# Patient Record
Sex: FEMALE | Race: WHITE | NOT HISPANIC OR LATINO | Employment: UNEMPLOYED | URBAN - METROPOLITAN AREA
[De-identification: names, ages, dates, MRNs, and addresses within clinical notes are randomized per-mention and may not be internally consistent; named-entity substitution may affect disease eponyms.]

---

## 2022-09-23 ENCOUNTER — ULTRASOUND (OUTPATIENT)
Dept: OBGYN CLINIC | Facility: MEDICAL CENTER | Age: 32
End: 2022-09-23
Payer: COMMERCIAL

## 2022-09-23 VITALS
BODY MASS INDEX: 38.83 KG/M2 | SYSTOLIC BLOOD PRESSURE: 120 MMHG | WEIGHT: 241.6 LBS | DIASTOLIC BLOOD PRESSURE: 78 MMHG | HEIGHT: 66 IN

## 2022-09-23 DIAGNOSIS — Z3A.08 8 WEEKS GESTATION OF PREGNANCY: ICD-10-CM

## 2022-09-23 DIAGNOSIS — O24.111 PRE-EXISTING TYPE 2 DIABETES MELLITUS DURING PREGNANCY IN FIRST TRIMESTER: ICD-10-CM

## 2022-09-23 DIAGNOSIS — Z36.89 ESTABLISH GESTATIONAL AGE, ULTRASOUND: ICD-10-CM

## 2022-09-23 DIAGNOSIS — O09.299 HISTORY OF PRE-ECLAMPSIA IN PRIOR PREGNANCY, CURRENTLY PREGNANT: ICD-10-CM

## 2022-09-23 DIAGNOSIS — N91.2 AMENORRHEA: Primary | ICD-10-CM

## 2022-09-23 DIAGNOSIS — O34.219 PREVIOUS CESAREAN DELIVERY, ANTEPARTUM: ICD-10-CM

## 2022-09-23 PROBLEM — O24.119 PRE-EXISTING TYPE 2 DIABETES MELLITUS DURING PREGNANCY: Status: ACTIVE | Noted: 2022-09-23

## 2022-09-23 PROCEDURE — 76817 TRANSVAGINAL US OBSTETRIC: CPT | Performed by: CLINICAL NURSE SPECIALIST

## 2022-09-23 RX ORDER — BLOOD SUGAR DIAGNOSTIC
STRIP MISCELLANEOUS
COMMUNITY
Start: 2022-08-26 | End: 2022-10-26 | Stop reason: SDUPTHER

## 2022-09-23 RX ORDER — LANCETS
EACH MISCELLANEOUS
COMMUNITY
Start: 2022-08-26

## 2022-09-23 RX ORDER — FLUOXETINE HYDROCHLORIDE 40 MG/1
80 CAPSULE ORAL DAILY
COMMUNITY
Start: 2022-09-20

## 2022-09-23 RX ORDER — ASPIRIN 81 MG
162 TABLET,CHEWABLE ORAL DAILY
COMMUNITY
Start: 2022-09-19

## 2022-09-23 RX ORDER — BLOOD-GLUCOSE METER
KIT MISCELLANEOUS
COMMUNITY
Start: 2022-08-26

## 2022-09-23 NOTE — PATIENT INSTRUCTIONS
Again, congratulations on your pregnancy! NEXT STEPS  Get Prenatal bloodwork  Call MFM to schedule next ultrasound (done 12-13 wks)   Contact information for [facility] (AKA Maternal Fetal Medicine)- Main Number [phone]   Return to our office in 1-2 weeks for an OB intake and initial prenatal Exam(or sooner if any problems/concerns arise- see packet for things to report)  Check out [employer] website to read the "Pregnancy Essentials Guide" -this has lots of great early pregnancy information     It can be found by going to [employer]  org-->select services-->select women's health-->select Obstetrics  [email or web address] [email or web address]    Below is a variety of information that is useful in early pregnancy   Genetic screening can be very confusing for most people   We do recommend genetic screening universally for all pregnant women  Our goal is to have the most healthy uncomplicated pregnancy possible and this is one way to identify possible complications early  Common disorders we can screen for include: Down Syndrome, Neural tube defects ( like spina bifida or gastroschisis) and trisomy 15, even possibly more  There are several options we will talk more about  Below is some information to get you started thinking about this  We will discuss this more at the next appt  GUIDE TO GENETIC TESTING    Aneuploidy Testing  Trisomy 21 (Down Syndrome), Trisomy 18, and Open Neural Tube Defects (Spina Bifida)  You may choose one of the following options: See below for CPT/Diagnostic codes  NIPT (Non-Invasive Prenatal Testing or Cell Free- Fetal DNA): This simple and accurate non-invasive prenatal screening blood test offers results for early risk assessment of Down syndrome (Trisomy 21), or Trisomy 25, trisomy 15 and other aneuploidy conditions  The test also offers an optional analysis for fetal sex    The test analyzes the relative amount of 21, 18, 13; X and Y chromosome material in circulating cell-free DNA from a maternal blood sample  This test can be performed at any time after 10 weeks gestation  If you elect this test, you will also have an AFP (alpha-fetoprotein) blood test to test for open neural tube defects  Recommended follow up to a positive result is genetic counseling and prenatal diagnosis  Sequential Screening with Nuchal Translucency: This is a two-step test to detect whether a fetus is at increased risk for trisomy 24, trisomy 25, trisomy 15 and open neural tube defects  The test has a narrow window for testing (the first step must be performed between 10 and 13 weeks gestation)  It includes two blood draws and an ultrasound  The ultrasound measures the amount of fluid behind the babys neck called the nuchal translucency (NT)  The blood tests measures three different maternal hormone levels, -- pregnancy associated plasma protein (YANIQUE-A), human chorionic gonadotrophin (hCG), and dimeric inhibin A (KARUNA)  These measurements in combination with some maternal information such as height and weight are used to calculate whether the baby is at increased risk for Down Syndrome or Trisomy 18  An alpha-fetoprotein test (AFP) is also included to test for open neural tube defects  Recommended follow up to a positive result is additional testing that is more definitive, and referral for genetic counseling and prenatal diagnosis  Quad Screen: This test is also known as the quadruple marker test   It is a test that measures levels of four substances in a pregnant womans blood--Alpha-fetoprotein (AFP), a protein made by the developing baby; Human chorionic gonadotropin (hCG), a hormone made by the placenta; Estriol, a hormone made by the placenta and the babys liver; and Inhibin A, another hormone made by the placenta    Typically, the quad screen is done between weeks 15 and 20 of pregnancy--the second trimester and the results indicate whether the baby is at higher risk for Down Syndrome (Trisomy 21), Trisomy 18, and open neural tube defects  This is a screening test   Recommended follow up to a positive result is additional testing that is more definitive, as well as referral for genetic counseling and prenatal diagnosis  Trisomy 21 Trisomy 18 Trisomy 13   NIPT  (FPR 0 1%) <99% <98% 99%   Sequential Screening (FPR 3 5%) 92% 90% N/A   Quad Screen   (FPR 5%) 83% 80% N/A   (FPR is False Positive Rate)       Additional Screening Tests Offered  Cystic Fibrosis: Cystic Fibrosis is the most common inherited disease of children and young adults  The carrier frequency is 1 in 24, to 1 in 97  Both parents need to be carriers for a child to be affected (25% chance)  One in 200, children born are affected  Cystic fibrosis is a disorder of mucus production and produces abnormally thick mucus leading to life threatening lung infections, digestion problems, poor growth, infertility, and more  Symptoms range from mild to severe, but individuals with severe disease may die in childhood  With treatments today, people with Cystic Fibrosis can live into their 30s  CF does not affect intelligence  Recommended follow up to a positive result is testing of the babys father  Spinal Muscular Atrophy (SMA): SMA is the most common inherited cause of early childhood death  The carrier frequency is 1 in 52 to 1 in 67 in the [place] and both parents need to be carriers for a child to be affected (25% chance)  1 in 11,000 children are affected  SMA is a progressive degeneration of lower motor neurons  Muscle weakness is the most common type with respiratory failure by the age of 3years old  Muscles responsible for crawling, walking, swallowing, and head and neck control are most severely affected  Recommended follow up to a positive result is testing of the babys father        Fragile X Syndrome (the most common inherited cause of developmental delays): Fragile X syndrome is an X-linked genetic disease, which means it is only carried by the mom  Unfortunately, 1 in 250 females are carriers and a child has a 50% chance of being affected if this is the case  1 in 4000 boys is affected with Fragile X and 1 in 8000 girls is affected  Approximately 1/3 of all children born with Fragile X also have autism and hyperactivity  Recommended follow up to a positive result is genetic counseling and prenatal diagnosis  Guide To Insurance Coverage For Genetic Screening  Due to the complexity of coverage guidelines, we are unable to quote benefits or guarantee insurance coverage for any of these tests  Insurance benefits are plan-specific and offer vastly different coverage based on your policy  The handout attached explains the benefits to each test, and also provides the billing (CPT) codes for each test   Even if the testing is covered, it could be applied to any unmet deductibles, and copays may apply, resulting in a bill  You are encouraged to contact your insurance company to obtain your benefits based on your age and risk factors, so that there are no surprises  Test Insurance Procedure (CPT) code   NIPT-cell free fetal DNA Most accurate non-invasive test- not always covered w/o risk factors 12054   Sequential Screen w/ NT US Current standard test-should be covered Part 1: (if lab is [facility]) 91256,30865   (If lab is [facility]) [address]67  Part 2: (if lab is [facility])47236,24603,84928, 31089  (If lab is [facility]) 53328   Quad screen Old standard-use if past 1st trimester 63100, [address] [facility], [address], [address]   CF- Cystic Fibrosis  89997   SMA- Spinal Musc   Atrophy  37880   Fragile X  [id number]       Diagnosis code used Varies based on history- But will be one of these:  Encounter for  screening for other genetic defect Z36 8  Advanced Maternal Age (over 28) O12 46  Family History of Genetic Disease Carrier Z84 81    Please contact your insurance company with the appropriate CPT code from the attached list, and diagnosis code applicable to your situation  We ask that you review this information and decide what testing you would like to have performed  Please note that the Sequential Screening with Nuchal Translucency has a smaller window of time to be performed during pregnancy (Prior to 14 wks)  [address], [address] [facility]    Warning Signs During Pregnancy  The list below includes warning signs your providers would like you to be aware of  If you experience any of these at any time during your pregnancy, please call us as soon as possible  Vaginal bleeding   Sharp abdominal pain that does not go away   Fever (more than 100  4? F and is not relieved with Tylenol)   Persistent vomiting lasting greater than 24 hours   Chest pain   Pain or burning when you urinate   Severe headache that doesnt resolve with Tylenol   Blurred vision or seeing spots in your vision   Sudden swelling of your face or hands   Redness, swelling or pain in a leg   A sudden weight gain in just a few days   Decrease in your babys movements (after 28 weeks or the 6th month of pregnancy)   A loss of watery fluid from your vagina - can be a gush, a trickle or  continuous wetness   After 20 weeks of pregnancy, rhythmic cramping (greater than 4 per hour)  or menstrual like low/pelvic pain    Call the OFFICE [phone] for any questions/emergencies  At night or on the weekend, please indicate it is an emergency and the DOCTOR on call will be paged      Discomforts of Early Pregnancy    Tips for coping with nausea and vomiting during pregnancy   Eat meals and snacks slowly   Eat every 1-2 hours to avoid a full stomach   Dont skip meals, avoid empty stomach   Eat a snack prior to getting out of bed   Avoid food and beverages with a strong aroma   Avoid dehydration - drink enough fluid to keep the urine pale yellow   Drink fluids before a meal to minimize the effect of a full stomach   Limit the amount of coffee and beverages that contain caffeine   Eliminate spicy, odorous, high fat (fried foods), acidic (tomato products) and sweet foods   Fluids that contain lemon (lemonade), mint (tea) or orange can usually be well tolerated   Snacks and meals that contain low-fat protein (lean meats, fish, poultry and eggs) along with eating easily digestible carbs (fruit, rice, toast, crackers and dry cereal) may be tolerated better   Foods with ginger may be well tolerated  May use ginger root powder, capsules or extract (up to 1000 mg per day)   Drink liquids in small amounts    If symptoms persist, please contact your provider  Tips for coping with constipation during pregnancy   Increase fiber and fluids   - Drink 8-10 cups of liquid, like water or low-sugar juice daily  - Keep urine pale with fluids (water, milk), fruit and vegetables   Eat a well-balanced diet that contains high fiber food (fruits, vegetables, whole grain breads and cereals, bran and dried beans)   Take a 30-minute walk daily   You may take a mild stool softener such as Colace®    If symptoms persist, please contact your provider  For any emergencies, PLEASE CALL THE OFFICE at [phone]  If the office is closed, the doctor on call will be paged by the answering service  Medications and Pregnancy- see Pregnancy Essentials guide online- page 9    Expected Weight Gain During Pregnancy  If you have a healthy BMI (18-25) prior to pregnancy:  The recommended weight gain is between 25-35 pounds  Approximate weight gain  in the first trimester is 1-4 5 pounds  An expected weight gain during the second and  third trimester is approximately one pound per week  If you have a BMI of less than 18 prior to pregnancy,  you are considered underweight:  The recommended weight gain is between 28-40 pounds  Approximate weight gain  in the first trimester is 1-4 5 pounds   An expected weight gain during the second and  third trimester is just over one pound per week  If your BMI is 25 to 29 9: you are considered overweight:  You should gain 1/2 to 2/3 pound during the second and third trimester, for a total  weight gain of 15 to 25 pounds  If you have a BMI of greater than 30 prior to pregnancy,  you are considered overweight:  The recommended weight gain is between 15-25 pounds  Approximate weight gain  in the first trimester is 1-4 5 pounds  An expected weight gain during the second  and third trimester is approximately 0 5 pound per week  Foods to avoid during pregnancy:   Unpasteurized milk, juice and cheese  - Soft cheeses like feta or brie (if made with UNPASTEURIZED milk)   Unheated deli meats like lunchmeat and hotdogs   Undercooked poultry, beef, pork, seafood including raw sushi    What fish is safe to eat during pregnancy? Eat 8 to 12 ounces of fish a week  Pick from this group frequently, especially if you follow  the American Heart Associations recommendation to eat fish at least 2 times a week  AVOID HIGH MERCURY FISH  A single meal of the following fish can put  you over the Environmental Protection  Agencys safe limit for the month  High mercury fish:  Shark  Swordfish  [employer]  Tile Fish    Caffeine and Pregnancy    The  and [address] [place] [address] of Obstetrics and Gynecologists (ACOG) urge pregnant  women to limit their caffeine consumption to no more than 200 milligrams (mg) per day  This is  comparable to having one 12-ounce cup of coffee a day  This level has been shown not to increase risk  of miscarriage, growth or  labor complications  Effects of higher levels are not known  Exercise During Pregnancy  A daily exercise program that consists of 30 minutes a day is recommended     Low impact exercises like walking and swimming are great exercises throughout  all of pregnancy   If youre an avid strength  avoid lifting very heavy weights - nothing more  than 30 pounds    Drink plenty of fluids while exercising to stay hydrated  Be careful to avoid overheating  ACTIVITIES TO AVOID   Exercises that can make you lose your balance  Activities that can put your baby at risk i e  horseback riding, scuba diving, skiing  or snowboarding  Any other sport that puts you at risk for getting hit in the  abdominal area  Do not use saunas, steam rooms or hot tubs (that have a higher temperature  than 100F)   After the first trimester, avoid exercises that require you to lay flat on your back  Avoid exceeding a heart rate greater than 140 beats per minute   As long as you are  able to hold a conversation while exercising your heart rate is likely acceptable

## 2022-09-23 NOTE — PROGRESS NOTES
Subjective  Patient ID: Rosibel Light is a 28 y o  female here for Pregnancy Ultrasound (LMP 22 = EGA 9+5/7d = 23)    She is C/O amenorrhea  Signs and symptoms of pregnancy:    Breast tenderness yes   Fatigue yes   Cramping or Pelvic Pain no   Spotting or Vaginal Bleeding no   Nausea or vomiting + nausea, no vomtting    Patient's last menstrual period was 2022 (exact date)  giving her an CANDELARIA of 23 and a gestational age of  6w7d  (based on LMP)    Menstrual cycle: irregular,   Pregnancy was unplanned/surpised  She has started taking a prenatal vitamin    OB History    Para Term  AB Living   3 2 2     2   SAB IAB Ectopic Multiple Live Births           2      # Outcome Date GA Lbr Osito/2nd Weight Sex Delivery Anes PTL Lv   3 Current            2 Term     M CS-Unspec   GAMALIEL   1 Term 2018    M CS-Unspec   GAMALIEL        The following portions of the patient's history were reviewed and updated as appropriate: allergies, current medications, past family history, past medical history, past social history, past surgical history, and problem list   Perinent hx that may affect pregnancy  · Type 2 DM,  · Hx of pre-e   · Previous C/S  · Bipolar dx    The following portions of the patient's history were reviewed and updated as appropriate: allergies, current medications, past family history, past medical history, past social history, past surgical history, and problem list     Review of Systems    See HPI for pertinent positives  /78 (BP Location: Left arm, Patient Position: Sitting, Cuff Size: Large)   Ht 5' 6" (1 676 m)   Wt 110 kg (241 lb 9 6 oz)   LMP 2022 (Exact Date)   Breastfeeding No   BMI 39 00 kg/m²   OBGyn Exam  No results found for this or any previous visit  FIRST TRIMESTER OBSTETRIC ULTRASOUND  [id number]    Patient's last menstrual period was 2022 (exact date)      INDICATION: establish GA      FINDINGS:  A single intrauterine gestation is identified  Gestational Sac: Present and normal appearing   Yolk Sac:  Present and normal in size and appearance  Mean Crown-Rump Length:  18 6 mm = 8weeks 3 days   Cardiac activity is detected at 170  Adnexa:  No adnexal mass or pathologic cyst   Cul de Sac:  No significant free fluid identified     IMPRESSION:  Single intrauterine pregnancy of 8 weeks 3days gestational age  Fetal cardiac activity detected  No adnexal masses seen  EDC by LMP: 23  EDC by this Ultrasound: 23  S< D    New EDC by this US                  Ultrasound Probe Disinfection    A transvaginal ultrasound was performed  Prior to use, disinfection was performed with High Level Disinfection Process ([place])  Probe serial number F: [id number] was used  Assessment/Plan:           Problem List Items Addressed This Visit     8 weeks gestation of pregnancy     She is a  with Patient's last menstrual period was 2022 (exact date)  US today is showing a viable IUP  S<D new EDC 23  F/U for ob intake, followed by initial prenatal exam in  2 wks            Relevant Orders    Ambulatory Referral to Maternal Fetal Medicine    History of pre-eclampsia in prior pregnancy, currently pregnant    Relevant Orders    Ambulatory Referral to Maternal Fetal Medicine    Pre-existing type 2 diabetes mellitus during pregnancy    Relevant Orders    Hemoglobin A1C    Ambulatory Referral to Maternal Fetal Medicine    Previous  delivery, antepartum    Relevant Orders    Ambulatory Referral to Maternal Fetal Medicine      Other Visit Diagnoses     Amenorrhea    -  Primary    Establish gestational age, ultrasound              Orders Placed This Encounter   Procedures    Hemoglobin A1C    Ambulatory Referral to Maternal Fetal Medicine

## 2022-09-23 NOTE — ASSESSMENT & PLAN NOTE
She is a  with Patient's last menstrual period was 2022 (exact date)  US today is showing a viable IUP  S<D new EDC 23  F/U for ob intake, followed by initial prenatal exam in  2 wks

## 2022-09-30 ENCOUNTER — INITIAL PRENATAL (OUTPATIENT)
Dept: OBGYN CLINIC | Facility: CLINIC | Age: 32
End: 2022-09-30
Payer: COMMERCIAL

## 2022-09-30 DIAGNOSIS — Z34.81 PRENATAL CARE, SUBSEQUENT PREGNANCY, FIRST TRIMESTER: Primary | ICD-10-CM

## 2022-09-30 PROCEDURE — T1001 NURSING ASSESSMENT/EVALUATN: HCPCS | Performed by: CLINICAL NURSE SPECIALIST

## 2022-09-30 NOTE — PROGRESS NOTES
OB INTAKE INTERVIEW  Patient is 28 y o    who presents for OB intake at 10-5 wks  She is accompanied by: phone interview  The father of her baby Keerthi Peck) is involved in the pregnancy and they are     Last Menstrual Period: 22  Ultrasound: Measured 9 weeks 5 days on 22  Estimated Date of Delivery: 23 via LMP     Signs/Symptoms of Pregnancy  Current pregnancy symptoms: nausea & vomiting- reviewed protocol for Vit B6 & Unisom  Constipation no  Headaches no  Cramping/spotting no  PICA cravings no    Diabetes-  Pt is a type 2 diabetic- takes metformin 1000 mg BID   If patient has 1 or more, please order early 1 hour GTT  History of GDM YES  BMI >35 YES  History of PCOS or current metformin use yes-metformin 1000 mg BID  History of LGA/macrosomic infant (4000g/9lbs) no    If patient has 2 or more, please order early 1 hour GTT  BMI>30 YES  AMA no  First degree relative with type 2 diabetes no  History of chronic HTN, hyperlipidemia, elevated A1C no  High risk race (, , ,  or ) no    Hypertension- if you answer yes, please order preeclampsia labs (cbc, comprehensive metabolic panel, urine protein creatinine ratio, uric acid)  History of of chronic HTN no  History of gestational HTN no  History of preeclampsia, eclampsia, or HELLP syndrome YES  History of diabetes YES  History of lupus, autoimmune disease, kidney disease no    Thyroid- if yes order TSH with reflex T4  History of thyroid disease no    Bleeding Disorder or Hx of DVT-patient or first degree relative with history of  Order the following if not done previously     (Factor V, antithrombin III, prothrombin gene mutation, protein C and S Ag, lupus anticoagulant, anticardiolipin, beta-2 glycoprotein)   no    OB/GYN-  History of abnormal pap smear no  History of HPV no  History of Herpes/HSV no  History of other STI (gonorrhea, chlamydia, trich) no  History of prior  no  History of prior  YES  History of  delivery prior to 36 weeks 6 days no  History of blood transfusion no  Ok for blood transfusion yes    Substance screening- if yes outside of tobacco for her or anyone in her home-order urine drug screen  History of tobacco use YES  Currently using tobacco no  Currently using alcohol no  Presently using drugs no  Past drug use  Yes- pt previously was taking medical marijuana for her mental health hx  IV drug use-If yes add Hep C antibody to labs no  Partner drug use no  Parent/Family drug use YES-pt's father  from accidental OD of methadone & xanax  MRSA Screening-   Does the pt have a hx of MRSA? no  If yes- please follow MRSA protocol and obtain a nasal swab for MRSA culture    Immunizations:  Influenza vaccine given this season no  Discussed Tdap vaccine yes  Discussed COVID Vaccine no    Genetic/MFM-  Do you or your partner have a history of any of the following in yourselves or first degree relatives? Cystic fibrosis no  Spinal muscular atrophy no  Hemoglobinopathy/Sickle Cell/Thalassemia no  Fragile X Intellectual Disability no    If yes, discuss carrier screening and recommend consultation with [facility]/genetic counseling  If no, discuss option for carrier screening and/or genetic testing with Nuchal Ultrasound  Patient interested yes  Appointment at [facility] made yes    Interview education  [facility] Pregnancy Essentials Book reviewed and discussed yes    Nurse/Family Partnership- patient may qualify no; referral placed no    Prenatal lab work scripts yes  Extra labs ordered:  Pre-eclampsia labs     The patient has a history now or in prior pregnancy notable for:   Hx of 2 prior C/S  Pt is a type 2 diabetic -metformin 100 mg BID  Hx of pre-eclampsia,  Hx of bipolar- serroquel & Prozac  - hx of medical marijuana use  Hx of left Salpingectomy     BMI  39 0  e       Details that I feel the provider should be aware of: Pt denies receiving both Flu & covid vaccines  Discussed importance of receiving both   PN phone interview completed  Pt happy with pregnancy  PN bldwk ordered, including pre-eclampsia labs  Advised pt go for fasting bldwk prior to PN1 visit scheduled for 10/10/22  Pt has appts scheduled at [facility] for  10/28/ & 11/18/22  Advised to call with any further questions/concerns         PN1 visit scheduled  The patient was oriented to our practice, reviewed delivering physicians and [facility] for Delivery  All questions were answered       Interviewed by: Kathrin Castle RN, [address] [place] [address]

## 2022-10-03 ENCOUNTER — APPOINTMENT (OUTPATIENT)
Dept: LAB | Facility: HOSPITAL | Age: 32
End: 2022-10-03
Payer: COMMERCIAL

## 2022-10-03 DIAGNOSIS — O24.111 PRE-EXISTING TYPE 2 DIABETES MELLITUS DURING PREGNANCY IN FIRST TRIMESTER: ICD-10-CM

## 2022-10-03 DIAGNOSIS — Z34.81 PRENATAL CARE, SUBSEQUENT PREGNANCY, FIRST TRIMESTER: Primary | ICD-10-CM

## 2022-10-03 LAB
ABO GROUP BLD: NORMAL
ALBUMIN SERPL BCP-MCNC: 4.1 G/DL (ref 3.5–5)
ALP SERPL-CCNC: 75 U/L (ref 46–116)
ALT SERPL W P-5'-P-CCNC: 51 U/L (ref 12–78)
ANION GAP SERPL CALCULATED.3IONS-SCNC: 11 MMOL/L (ref 4–13)
AST SERPL W P-5'-P-CCNC: 20 U/L (ref 5–45)
BACTERIA UR QL AUTO: ABNORMAL /HPF
BASOPHILS # BLD AUTO: 0.02 THOUSANDS/ΜL (ref 0–0.1)
BASOPHILS NFR BLD AUTO: 0 % (ref 0–1)
BILIRUB SERPL-MCNC: 0.38 MG/DL (ref 0.2–1)
BILIRUB UR QL STRIP: NEGATIVE
BLD GP AB SCN SERPL QL: NEGATIVE
BUN SERPL-MCNC: 8 MG/DL (ref 5–25)
CALCIUM SERPL-MCNC: 9.4 MG/DL (ref 8.3–10.1)
CHLORIDE SERPL-SCNC: 99 MMOL/L (ref 96–108)
CLARITY UR: CLEAR
CO2 SERPL-SCNC: 25 MMOL/L (ref 21–32)
COLOR UR: YELLOW
CREAT SERPL-MCNC: 0.47 MG/DL (ref 0.6–1.3)
CREAT UR-MCNC: 209 MG/DL
EOSINOPHIL # BLD AUTO: 0.06 THOUSAND/ΜL (ref 0–0.61)
EOSINOPHIL NFR BLD AUTO: 1 % (ref 0–6)
ERYTHROCYTE [DISTWIDTH] IN BLOOD BY AUTOMATED COUNT: 12.9 % (ref 11.6–15.1)
EST. AVERAGE GLUCOSE BLD GHB EST-MCNC: 143 MG/DL
GFR SERPL CREATININE-BSD FRML MDRD: 131 ML/MIN/1.73SQ M
GLUCOSE P FAST SERPL-MCNC: 107 MG/DL (ref 65–99)
GLUCOSE UR STRIP-MCNC: NEGATIVE MG/DL
HBA1C MFR BLD: 6.6 %
HCT VFR BLD AUTO: 42 % (ref 34.8–46.1)
HGB BLD-MCNC: 14.2 G/DL (ref 11.5–15.4)
HGB UR QL STRIP.AUTO: NEGATIVE
IMM GRANULOCYTES # BLD AUTO: 0.03 THOUSAND/UL (ref 0–0.2)
IMM GRANULOCYTES NFR BLD AUTO: 0 % (ref 0–2)
KETONES UR STRIP-MCNC: NEGATIVE MG/DL
LEUKOCYTE ESTERASE UR QL STRIP: NEGATIVE
LYMPHOCYTES # BLD AUTO: 2.44 THOUSANDS/ΜL (ref 0.6–4.47)
LYMPHOCYTES NFR BLD AUTO: 27 % (ref 14–44)
MCH RBC QN AUTO: 29.6 PG (ref 26.8–34.3)
MCHC RBC AUTO-ENTMCNC: 33.8 G/DL (ref 31.4–37.4)
MCV RBC AUTO: 88 FL (ref 82–98)
MONOCYTES # BLD AUTO: 0.45 THOUSAND/ΜL (ref 0.17–1.22)
MONOCYTES NFR BLD AUTO: 5 % (ref 4–12)
MUCOUS THREADS UR QL AUTO: ABNORMAL
NEUTROPHILS # BLD AUTO: 6.17 THOUSANDS/ΜL (ref 1.85–7.62)
NEUTS SEG NFR BLD AUTO: 67 % (ref 43–75)
NITRITE UR QL STRIP: NEGATIVE
NON-SQ EPI CELLS URNS QL MICRO: ABNORMAL /HPF
NRBC BLD AUTO-RTO: 0 /100 WBCS
PH UR STRIP.AUTO: 6.5 [PH]
PLATELET # BLD AUTO: 304 THOUSANDS/UL (ref 149–390)
PMV BLD AUTO: 9.6 FL (ref 8.9–12.7)
POTASSIUM SERPL-SCNC: 4.1 MMOL/L (ref 3.5–5.3)
PROT SERPL-MCNC: 8.4 G/DL (ref 6.4–8.4)
PROT UR STRIP-MCNC: NEGATIVE MG/DL
PROT UR-MCNC: 21 MG/DL
PROT/CREAT UR: 0.1 MG/G{CREAT} (ref 0–0.1)
RBC # BLD AUTO: 4.8 MILLION/UL (ref 3.81–5.12)
RBC #/AREA URNS AUTO: ABNORMAL /HPF
RH BLD: POSITIVE
SODIUM SERPL-SCNC: 135 MMOL/L (ref 135–147)
SP GR UR STRIP.AUTO: >=1.03 (ref 1–1.03)
SPECIMEN EXPIRATION DATE: NORMAL
URATE SERPL-MCNC: 2.9 MG/DL (ref 2–7.5)
UROBILINOGEN UR QL STRIP.AUTO: 0.2 E.U./DL
WBC # BLD AUTO: 9.17 THOUSAND/UL (ref 4.31–10.16)
WBC #/AREA URNS AUTO: ABNORMAL /HPF

## 2022-10-03 PROCEDURE — 80053 COMPREHEN METABOLIC PANEL: CPT

## 2022-10-03 PROCEDURE — 84550 ASSAY OF BLOOD/URIC ACID: CPT

## 2022-10-03 PROCEDURE — 36415 COLL VENOUS BLD VENIPUNCTURE: CPT

## 2022-10-03 PROCEDURE — 86901 BLOOD TYPING SEROLOGIC RH(D): CPT

## 2022-10-03 PROCEDURE — 87340 HEPATITIS B SURFACE AG IA: CPT

## 2022-10-03 PROCEDURE — 87389 HIV-1 AG W/HIV-1&-2 AB AG IA: CPT

## 2022-10-03 PROCEDURE — 81001 URINALYSIS AUTO W/SCOPE: CPT

## 2022-10-03 PROCEDURE — 82570 ASSAY OF URINE CREATININE: CPT

## 2022-10-03 PROCEDURE — 86850 RBC ANTIBODY SCREEN: CPT

## 2022-10-03 PROCEDURE — 84156 ASSAY OF PROTEIN URINE: CPT

## 2022-10-03 PROCEDURE — 87086 URINE CULTURE/COLONY COUNT: CPT

## 2022-10-03 PROCEDURE — 86900 BLOOD TYPING SEROLOGIC ABO: CPT

## 2022-10-03 PROCEDURE — 83036 HEMOGLOBIN GLYCOSYLATED A1C: CPT

## 2022-10-03 PROCEDURE — 86592 SYPHILIS TEST NON-TREP QUAL: CPT

## 2022-10-03 PROCEDURE — 85025 COMPLETE CBC W/AUTO DIFF WBC: CPT

## 2022-10-03 PROCEDURE — 86803 HEPATITIS C AB TEST: CPT

## 2022-10-04 LAB
BACTERIA UR CULT: NORMAL
HBV SURFACE AG SER QL: NORMAL
HCV AB SER QL: NORMAL
HIV 1+2 AB+HIV1 P24 AG SERPL QL IA: NORMAL
RPR SER QL: NORMAL

## 2022-10-10 ENCOUNTER — INITIAL PRENATAL (OUTPATIENT)
Dept: OBGYN CLINIC | Age: 32
End: 2022-10-10

## 2022-10-10 VITALS
HEIGHT: 66 IN | WEIGHT: 238.2 LBS | DIASTOLIC BLOOD PRESSURE: 70 MMHG | BODY MASS INDEX: 38.28 KG/M2 | SYSTOLIC BLOOD PRESSURE: 110 MMHG

## 2022-10-10 DIAGNOSIS — Z11.3 SCREENING FOR STD (SEXUALLY TRANSMITTED DISEASE): Primary | ICD-10-CM

## 2022-10-10 DIAGNOSIS — O21.9 NAUSEA AND VOMITING DURING PREGNANCY: ICD-10-CM

## 2022-10-10 DIAGNOSIS — Z3A.12 12 WEEKS GESTATION OF PREGNANCY: ICD-10-CM

## 2022-10-10 DIAGNOSIS — Z12.4 ENCOUNTER FOR SCREENING FOR CERVICAL CANCER: ICD-10-CM

## 2022-10-10 DIAGNOSIS — Z34.81 PRENATAL CARE, SUBSEQUENT PREGNANCY, FIRST TRIMESTER: ICD-10-CM

## 2022-10-10 PROBLEM — L92.0 GRANULOMA ANNULARE: Status: ACTIVE | Noted: 2022-09-19

## 2022-10-10 PROBLEM — Z87.59 HISTORY OF POLYHYDRAMNIOS: Status: ACTIVE | Noted: 2022-09-18

## 2022-10-10 PROBLEM — Z01.84 LACK OF IMMUNITY TO HEPATITIS B VIRUS DEMONSTRATED BY SEROLOGIC TEST: Status: ACTIVE | Noted: 2022-09-22

## 2022-10-10 PROBLEM — F41.9 ANXIETY: Status: ACTIVE | Noted: 2022-09-19

## 2022-10-10 PROBLEM — F32.A DEPRESSION AFFECTING PREGNANCY, ANTEPARTUM: Status: ACTIVE | Noted: 2022-09-18

## 2022-10-10 PROBLEM — Z13.32 ENCOUNTER FOR SCREENING FOR MATERNAL DEPRESSION: Status: ACTIVE | Noted: 2022-09-18

## 2022-10-10 PROBLEM — O99.340 DEPRESSION AFFECTING PREGNANCY, ANTEPARTUM: Status: ACTIVE | Noted: 2022-09-18

## 2022-10-10 PROBLEM — F12.90 MARIJUANA USE: Status: ACTIVE | Noted: 2022-09-18

## 2022-10-10 PROBLEM — O09.899 SHORT INTERVAL BETWEEN PREGNANCIES COMPLICATING PREGNANCY, ANTEPARTUM: Status: ACTIVE | Noted: 2022-09-18

## 2022-10-10 PROBLEM — F43.10 PTSD (POST-TRAUMATIC STRESS DISORDER): Status: ACTIVE | Noted: 2022-09-19

## 2022-10-10 PROBLEM — O99.210 OBESITY AFFECTING PREGNANCY, ANTEPARTUM: Status: ACTIVE | Noted: 2022-09-18

## 2022-10-10 LAB
SL AMB  POCT GLUCOSE, UA: POSITIVE
SL AMB POCT URINE PROTEIN: NEGATIVE

## 2022-10-10 PROCEDURE — G0145 SCR C/V CYTO,THINLAYER,RESCR: HCPCS

## 2022-10-10 PROCEDURE — G0476 HPV COMBO ASSAY CA SCREEN: HCPCS

## 2022-10-10 PROCEDURE — 87591 N.GONORRHOEAE DNA AMP PROB: CPT

## 2022-10-10 PROCEDURE — 87491 CHLMYD TRACH DNA AMP PROBE: CPT

## 2022-10-10 RX ORDER — ONDANSETRON 4 MG/1
4 TABLET, ORALLY DISINTEGRATING ORAL EVERY 6 HOURS PRN
Qty: 20 TABLET | Refills: 0 | Status: SHIPPED | OUTPATIENT
Start: 2022-10-10

## 2022-10-10 RX ORDER — QUETIAPINE FUMARATE 300 MG/1
300 TABLET, FILM COATED ORAL
COMMUNITY
Start: 2022-09-22 | End: 2022-10-26 | Stop reason: SDUPTHER

## 2022-10-10 NOTE — PROGRESS NOTES
PN1 12w1d    Patient denies any lof, vb or ctx  Patient urine is Protein neg/ Glucose ++++  Patient is diabetic  Prenatal labs is done  [facility] folder given  Flu vaccine declined  Pap, GC/CT collected today  Patient want to know when should she start taking Aspirin

## 2022-10-10 NOTE — PROGRESS NOTES
Problem   12 Weeks Gestation of Pregnancy    Blood Type: B positive  Antibody negative  Pap Not on file  GC/CT -  collected  PN1 Labs- WNL H&H- 14 2/42  0  baseline preE labs wnl   28 Week Labs-  COVID vaccine-   Flu vaccine -  Blue folder- Reviewed    Genetic screening- plans to complete NIPT  AFP-  Level 1- 10/28  Has VV with DP 10/21  Level 2- 11/18    Yellow folder-  TDAP -   Delivery consent-  Breast pump -   Pediatrician -    Perineal massage -  GBS swab -   IOL -     Lack of Immunity to Hepatitis B Virus Demonstrated By Serologic Test   Ptsd (Post-Traumatic Stress Disorder)   Granuloma Annulare    Last Assessment & Plan:   Formatting of this note might be different from the original   Multiple 8 mm flat lesions with dark border on c/sec scar, one L breast, one on R leg, some on abdomen  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  Has appt with dermatology in December  Anxiety   Obesity Affecting Pregnancy, Antepartum    Formatting of this note might be different from the original   Pregravid BMI: 39 24    Last Assessment & Plan:   Formatting of this note might be different from the original   Pregravid BMI: 39 24  Discussed diet/exercise/recommended weight gain for optimal health in pregnancy  Recommend protein in every meal and snack, drink 1 gallon of water daily, avoid sugar/white flour  Marijuana Use    Last Assessment & Plan:   Formatting of this note might be different from the original   Has medical marijuana card in [place], doesn't have PA card, recently moved from [place] and still needs to get PA ID  Struggles with anxiety/panic, found marijuana helpful  Recommend not to use marijuana in pregnancy       Short Interval Between Pregnancies Complicating Pregnancy, Antepartum    Formatting of this note might be different from the original   Previous birth 7/2021     History of Polyhydramnios   Encounter for Screening for Maternal Depression    Formatting of this note might be different from the original   Radha Lopez PP Depression Score:  NOB: 5  w:  PP:    Last Assessment & Plan:   Formatting of this note might be different from the original   Eden PP Depression Score: 5     Depression Affecting Pregnancy, Antepartum    Last Assessment & Plan:   Formatting of this note might be different from the original   Pt taking seroquel and prozac  Under care of a therapist, has an appointment tomorrow  States was diagnosed with bipolar but not sure what type  Pt will try to get records  Eden 5 today, feels mental health well controlled on medications  Crisis contact information provided  12 weeks gestation of pregnancy  PN1  Irena Rendon is a 28y o  year old  at 12w1d who presents for initial prenatal visit  LMP: Patient's last menstrual period was 2022 (exact date)  Gestational age 14w4d based on LMP Yes , consistent with early US Yes    3  Para 2002           Previous C Section: Yes x 2      PMHx significant for type 2 DM and history of preE in prior pregnancy  Her obstetrical, medical, surgical and family history was reviewed  Prepregnancy BMI: 38 76  total expected weight gain 5 kg (11 lb)-9 kg (19 lb)  Vitals:    10/10/22 1442   BP: 110/70   BP Location: Right arm   Patient Position: Sitting   Weight: 108 kg (238 lb 3 2 oz)   Height: 5' 6" (1 676 m)       Her physical exam was within normal limits    She reports nausea and vomiting  She has had no vaginal bleeding  Patients has no complaints  Denies pelvic pain or cramping  NT scan scheduled on 10/28  She is planning to complete genetic screening  Allergies: Patient has no known allergies    Medication use :   Current Outpatient Medications   Medication Sig Dispense Refill   • Blood Glucose Monitoring Suppl (OneTouch Verio Reflect) w/Device KIT DX: E11 9, THREE TIMES A DAY     • FLUoxetine (PROzac) 40 MG capsule Take 80 mg by mouth daily     • Lancets (onetouch ultrasoft) lancets TESTING 3X DAILY DX E11 9     • metFORMIN (GLUCOPHAGE) 1000 MG tablet Take 1,000 mg by mouth 2 (two) times a day with meals     • ondansetron (Zofran ODT) 4 mg disintegrating tablet Take 1 tablet (4 mg total) by mouth every 6 (six) hours as needed for nausea or vomiting 20 tablet 0   • OneTouch Verio test strip TESTING 3X DAILY DX E11 9     • Prenatal MV-Min-Fe Fum-FA-DHA (PRENATAL 1 PO) Take by mouth     • QUEtiapine Fumarate (SEROQUEL PO) Take 300 mg by mouth     • Aspirin Low Dose 81 MG chewable tablet Chew 162 mg daily (Patient not taking: No sig reported)     • QUEtiapine (SEROquel) 300 mg tablet Take 300 mg by mouth daily at bedtime (Patient not taking: Reported on 10/10/2022)       No current facility-administered medications for this visit  She is a non-smoker    Prenatal Labs   B positive  Antibody negative  CBC WNL  Hep B nonreactive  HIV nonreactive  RPR nonreactive  Rubella Immune  GCCT collected today   Pap Not on file  A Pap smear was obtained,    Risk factors for this pregnancy include: prior preE, obesity, type 2 DM  CS x2  Patient Education: Patient was counseled regarding diet, exercise, weight gain, foods to avoid, vaccines in pregnancy, aneuploidy screening, travel precautions to include seat belt use and VTE risk reduction  She has been provided our pregnancy packet which includes pregnancy warning signs,how and when to contact providers, visit intervals, Maternal fetal medicine information, medication recommendations that are safe during pregnancy, dietary suggestions, activity recommendations, breastfeeding information as well as websites for additional information, and delivery location

## 2022-10-10 NOTE — PATIENT INSTRUCTIONS
Pregnancy at 11 to 14 Weeks   AMBULATORY CARE:   Changes happening to your body: You are now at the end of your first trimester and entering your second trimester  Morning sickness usually goes away by this time  You may have other symptoms such as fatigue, frequent urination, and headaches  You may have gained 2 to 4 pounds by now  Seek care immediately if:   You have pain or cramping in your abdomen or low back  You have heavy vaginal bleeding or clotting  You pass material that looks like tissue or large clots  Collect the material and bring it with you  Call your doctor or obstetrician if:   You cannot keep food or drinks down, and you are losing weight  You have light vaginal bleeding  You have chills or a fever  You have vaginal itching, burning, or pain  You have yellow, green, white, or foul-smelling vaginal discharge  You have pain or burning when you urinate, less urine than usual, or pink or bloody urine  You have questions or concerns about your condition or care  How to care for yourself at this stage of your pregnancy:       Get plenty of rest   You may feel more tired than normal  You may need to take naps or go to bed earlier  Manage nausea and vomiting  Avoid fatty and spicy foods  Eat small meals throughout the day instead of large meals  Crys may help to decrease nausea  Ask your healthcare provider about other ways of decreasing nausea and vomiting  Eat a variety of healthy foods  Healthy foods include fruits, vegetables, whole-grain breads, low-fat dairy foods, beans, lean meats, and fish  Drink liquids as directed  Ask how much liquid to drink each day and which liquids are best for you  Limit caffeine to less than 200 milligrams each day  Limit your intake of fish to 2 servings each week  Choose fish low in mercury such as canned light tuna, shrimp, salmon, cod, or tilapia   Do not  eat fish high in mercury such as swordfish, tilefish, benigno mackerel, and shark  Take prenatal vitamins as directed  Your need for certain vitamins and minerals, such as folic acid, increases during pregnancy  Prenatal vitamins provide some of the extra vitamins and minerals you need  Prenatal vitamins may also help to decrease the risk of certain birth defects  Do not smoke  Smoking increases your risk of a miscarriage and other health problems during your pregnancy  Smoking can cause your baby to be born too early or weigh less at birth  Ask your healthcare provider for information if you need help quitting  Do not drink alcohol  Alcohol passes from your body to your baby through the placenta  It can affect your baby's brain development and cause fetal alcohol syndrome (FAS)  FAS is a group of conditions that causes mental, behavior, and growth problems  Talk to your healthcare provider before you take any medicines  Many medicines may harm your baby if you take them when you are pregnant  Do not take any medicines, vitamins, herbs, or supplements without first talking to your healthcare provider  Never use illegal or street drugs (such as marijuana or cocaine) while you are pregnant  Safety tips during pregnancy:   Avoid hot tubs and saunas  Do not use a hot tub or sauna while you are pregnant, especially during your first trimester  Hot tubs and saunas may raise your baby's temperature and increase the risk of birth defects  Avoid toxoplasmosis  This is an infection caused by eating raw meat or being around infected cat feces  It can cause birth defects, miscarriages, and other problems  Wash your hands after you touch raw meat  Make sure any meat is well-cooked before you eat it  Avoid raw eggs and unpasteurized milk  Use gloves or ask someone else to clean your cat's litter box while you are pregnant  Changes happening with your baby: Your baby has fully formed fingernails and toenails  Your baby's heartbeat can now be heard   Ask your healthcare provider if you can listen to your baby's heartbeat  By week 14, your baby is over 4 inches long from the top of the head to the rump (baby's bottom)  Your baby weighs over 3 ounces  Prenatal care:  Prenatal care is a series of visits with your healthcare provider throughout your pregnancy  During the first 28 weeks of your pregnancy, you will see your healthcare provider 1 time each month  Prenatal care can help prevent problems during pregnancy and childbirth  Your healthcare provider will check your blood pressure and weight  Your baby's heart rate will also be checked  You may also need the following at some visits:  A pelvic exam  allows your healthcare provider to see your cervix (the bottom part of your uterus)  Your healthcare provider will use a speculum to open your vagina  He or she will check the size and shape of your uterus  Blood tests  may be done to check for any of the following:    Gestational diabetes or anemia (low iron level)    Blood type or Rh factor, or certain birth defects    Immunity to certain diseases, such as chickenpox or rubella    An infection, such as a sexually transmitted infection, HIV, or hepatitis B    Hepatitis B  may need to be prevented or treated  Hepatitis B is inflammation of the liver caused by the hepatitis B virus (HBV)  HBV can spread from a mother to her baby during delivery  You will be checked for HBV as early as possible in the first trimester of each pregnancy  You need the test even if you received the hepatitis B vaccine or were tested before  You may need to have an HBV infection treated before you give birth  Urine tests  may also be done to check for sugar and protein  These can be signs of gestational diabetes or preeclampsia  Urine tests may also be done to check for signs of infection  A fetal ultrasound  shows pictures of your baby inside your uterus  The pictures are used to check your baby's development, movement, and position  Genetic disorder screening tests  may be offered to you  These tests check your baby's risk for genetic disorders such as Down syndrome  A screening test includes a blood test and ultrasound  Follow up with your doctor or obstetrician as directed:  Go to all prenatal visits  Write down your questions so you remember to ask them during your visits  © Copyright [employer] 2022 Information is for End User's use only and may not be sold, redistributed or otherwise used for commercial purposes  All illustrations and images included in CareNotes® are the copyrighted property of [employer]  or [place] [facility]   The above information is an  only  It is not intended as medical advice for individual conditions or treatments  Talk to your doctor, nurse or pharmacist before following any medical regimen to see if it is safe and effective for you  Nausea and Vomiting in Pregnancy   WHAT YOU NEED TO KNOW:   What do I need to know about nausea and vomiting in pregnancy? Nausea and vomiting can happen any time of day  These symptoms usually start before the 9th week of pregnancy, and end by the 14th week (second trimester)  Some women can have nausea and vomiting for a longer time  These symptoms can affect some women throughout the entire pregnancy  Nausea and vomiting do not harm your baby  These symptoms can make it hard for you to do your daily activities  What causes or increases my risk for nausea and vomiting in pregnancy? The cause of nausea and vomiting in pregnancy is not known  Pregnancy causes changes in your hormones that may lead to nausea and vomiting   The following may increase your risk of nausea and vomiting:  Being pregnant with more than one baby    Nausea and vomiting in a past pregnancy    Having a sister or mother who had nausea and vomiting during pregnancy    History of migraine headaches or motion sickness    Being pregnant with a female baby    How is nausea and vomiting in pregnancy treated? Treatment for nausea and vomiting in pregnancy is usually not needed  You can make changes in the foods you eat and in your activities to help manage your symptoms  You may need to try several things to learn what works for you  Talk to your healthcare provider if your symptoms do not decrease with the changes suggested below  You may need vitamin B6 and medicine if these changes do not help, or your symptoms become severe  What nutrition changes can I make to manage nausea and vomiting? Eat small meals throughout the day instead of 3 large meals  You may be more likely to have nausea and vomiting when your stomach is empty  Eat foods that are low in fat and high in protein  Examples are lean meat, beans, turkey, and chicken without the skin  Eat a small snack, such as crackers, dry cereal, or a small sandwich before you go to bed  Eat some crackers or dry toast before you get out of bed in the morning  Get out of bed slowly  Sudden movements could cause you to get dizzy and nauseated  Eat bland foods when you feel nauseated  Examples of bland foods include dry toast, dry cereal, plain pasta, white rice, and bread  Other bland foods include saltine crackers, bananas, gelatin, and pretzels  Avoid spicy, greasy, and fried foods  Avoid any other foods that make you feel nauseated  Drink liquids that contain ginger  Drink ginger ale made with real ginger or ginger tea made with fresh grated ginger  Ginger capsules or ginger candies may also help to decrease nausea and vomiting  Drink liquids between meals instead of with meals  Wait at least 30 minutes after you eat to drink liquids  Drink small amounts of liquids often throughout the day to prevent dehydration  Ask how much liquid you should drink each day  What other changes can I make to manage nausea and vomiting? Avoid smells that bother you    Strong odors may cause nausea and vomiting to start, or make it worse  Take a short walk, turn on a fan, or try to sleep with the window open to get fresh air  When you are cooking, open windows to get rid of smells that may cause nausea  Do not brush your teeth right after you eat  if it makes you nauseated  Rest when you need to  Start activity slowly and work up to your usual routine as you start to feel better  Talk to your healthcare provider about your prenatal vitamins  Prenatal vitamins can cause nausea for some women  Try taking your prenatal vitamin at night or with a snack  If this change does not help, your healthcare provider may recommend a different type of vitamin  Do not use any medicines, vitamins, or supplements to manage your symptoms without asking your healthcare provider  Many medicines can harm an unborn baby  Light to moderate exercise  may help to decrease your symptoms  It may also help you to sleep better at night  Ask your healthcare provider about the best exercise plan for you  When should I seek immediate care? You have signs of dehydration  Examples are dark yellow urine, dry mouth and lips, dry skin, fast heartbeat, and urinating less than usual     You have severe abdominal pain  You feel too weak or dizzy to stand up  You see blood in your vomit or bowel movements  When should I call my doctor? You vomit more than 4 times in 1 day  You have not been able to keep liquids down for more than 1 day  You lose more than 2 pounds  You have a fever  Your nausea and vomiting continue longer than 14 weeks  You have questions or concerns about your condition or care  CARE AGREEMENT:   You have the right to help plan your care  Learn about your health condition and how it may be treated  Discuss treatment options with your healthcare providers to decide what care you want to receive  You always have the right to refuse treatment  The above information is an  only   It is not intended as medical advice for individual conditions or treatments  Talk to your doctor, nurse or pharmacist before following any medical regimen to see if it is safe and effective for you  © Copyright [employer]  Information is for End User's use only and may not be sold, redistributed or otherwise used for commercial purposes  All illustrations and images included in CareNotes® are the copyrighted property of [employer]  or [address]:     [facility] pregnancy essential guide    http://ceasar weir/      On the right side of the screen is a 50 page guide providing valuable information about your entire pregnancy  On the left hand side of the site you will see several other links to great information and resources that [facility] offers     If you click on the tab that says "Pregnancy and Birth Packet" this opens another  guide to labor and delivery information as well as breast feeding information,  care, pediatricians, car seat safety and much more     The [facility] and [address] tab has a virtual tour of the new L&D unit, as well as valuable information about classes that are offered, breast feeding support, support groups and much more  I highly recommend the virtual Breast Feeding class, very informational even if you have breast fed in the past  Check for available dates ! Click around and enjoy all we have to offer!     Please note that all information in regards to locations and visiting hours have not been updated due to [address] [facility] delivery location is [address] [place] [address],[address] [id number] [place] [id number]

## 2022-10-10 NOTE — ASSESSMENT & PLAN NOTE
Clarissa Mckeon is a 28y o  year old  at 12w1d who presents for initial prenatal visit  LMP: Patient's last menstrual period was 2022 (exact date)  Gestational age 14w4d based on LMP Yes , consistent with early US Yes    3  Para 2002           Previous C Section: Yes x 2      PMHx significant for type 2 DM and history of preE in prior pregnancy  Her obstetrical, medical, surgical and family history was reviewed  Prepregnancy BMI: 38 76  total expected weight gain 5 kg (11 lb)-9 kg (19 lb)  Vitals:    10/10/22 1442   BP: 110/70   BP Location: Right arm   Patient Position: Sitting   Weight: 108 kg (238 lb 3 2 oz)   Height: 5' 6" (1 676 m)       Her physical exam was within normal limits    She reports nausea and vomiting  She has had no vaginal bleeding  Patients has no complaints  Denies pelvic pain or cramping  NT scan scheduled on 10/28  She is planning to complete genetic screening  Allergies: Patient has no known allergies    Medication use :   Current Outpatient Medications   Medication Sig Dispense Refill   • Blood Glucose Monitoring Suppl (OneTouch Verio Reflect) w/Device KIT DX: E11 9, THREE TIMES A DAY     • FLUoxetine (PROzac) 40 MG capsule Take 80 mg by mouth daily     • Lancets (onetouch ultrasoft) lancets TESTING 3X DAILY DX E11 9     • metFORMIN (GLUCOPHAGE) 1000 MG tablet Take 1,000 mg by mouth 2 (two) times a day with meals     • ondansetron (Zofran ODT) 4 mg disintegrating tablet Take 1 tablet (4 mg total) by mouth every 6 (six) hours as needed for nausea or vomiting 20 tablet 0   • OneTouch Verio test strip TESTING 3X DAILY DX E11 9     • Prenatal MV-Min-Fe Fum-FA-DHA (PRENATAL 1 PO) Take by mouth     • QUEtiapine Fumarate (SEROQUEL PO) Take 300 mg by mouth     • Aspirin Low Dose 81 MG chewable tablet Chew 162 mg daily (Patient not taking: No sig reported)     • QUEtiapine (SEROquel) 300 mg tablet Take 300 mg by mouth daily at bedtime (Patient not taking: Reported on 10/10/2022)       No current facility-administered medications for this visit  She is a non-smoker    Prenatal Labs   B positive  Antibody negative  CBC WNL  Hep B nonreactive  HIV nonreactive  RPR nonreactive  Rubella Immune  GCCT collected today   Pap Not on file  A Pap smear was obtained,    Risk factors for this pregnancy include: prior preE, obesity, type 2 DM  CS x2  Patient Education: Patient was counseled regarding diet, exercise, weight gain, foods to avoid, vaccines in pregnancy, aneuploidy screening, travel precautions to include seat belt use and VTE risk reduction  She has been provided our pregnancy packet which includes pregnancy warning signs,how and when to contact providers, visit intervals, Maternal fetal medicine information, medication recommendations that are safe during pregnancy, dietary suggestions, activity recommendations, breastfeeding information as well as websites for additional information, and delivery location

## 2022-10-12 ENCOUNTER — TELEPHONE (OUTPATIENT)
Dept: PERINATAL CARE | Facility: OTHER | Age: 32
End: 2022-10-12

## 2022-10-12 NOTE — TELEPHONE ENCOUNTER
Spoke with patient and confirmed her MFM appointment had to be rescheduled  Patient verbalized understanding of new time, date and location of appointment - 11/18/22  10:00  BRYAN  Patient denies further questions  ALSO:  Spoke with pt to inform them that at 11/18/22 appointment, doctor will not be available to see them with results of visit  After visit, patient can expect a call or message in [facility] with results  Pt understood and had no additional questions

## 2022-10-13 LAB
C TRACH DNA SPEC QL NAA+PROBE: NEGATIVE
HPV HR 12 DNA CVX QL NAA+PROBE: NEGATIVE
HPV16 DNA CVX QL NAA+PROBE: NEGATIVE
HPV18 DNA CVX QL NAA+PROBE: NEGATIVE
N GONORRHOEA DNA SPEC QL NAA+PROBE: NEGATIVE

## 2022-10-15 LAB
LAB AP GYN PRIMARY INTERPRETATION: NORMAL
Lab: NORMAL

## 2022-10-26 ENCOUNTER — TELEMEDICINE (OUTPATIENT)
Dept: PERINATAL CARE | Facility: CLINIC | Age: 32
End: 2022-10-26

## 2022-10-26 ENCOUNTER — TELEPHONE (OUTPATIENT)
Dept: PERINATAL CARE | Facility: CLINIC | Age: 32
End: 2022-10-26

## 2022-10-26 VITALS — BODY MASS INDEX: 38.25 KG/M2 | WEIGHT: 238 LBS | HEIGHT: 66 IN

## 2022-10-26 DIAGNOSIS — Z3A.14 14 WEEKS GESTATION OF PREGNANCY: ICD-10-CM

## 2022-10-26 DIAGNOSIS — O99.210 OBESITY AFFECTING PREGNANCY, ANTEPARTUM: ICD-10-CM

## 2022-10-26 DIAGNOSIS — O24.112 PRE-EXISTING TYPE 2 DIABETES MELLITUS DURING PREGNANCY IN SECOND TRIMESTER: Primary | ICD-10-CM

## 2022-10-26 RX ORDER — LANCETS 33 GAUGE
EACH MISCELLANEOUS
Qty: 100 EACH | Refills: 6 | Status: SHIPPED | OUTPATIENT
Start: 2022-10-26

## 2022-10-26 RX ORDER — BLOOD SUGAR DIAGNOSTIC
STRIP MISCELLANEOUS
Qty: 150 EACH | Refills: 6 | Status: SHIPPED | OUTPATIENT
Start: 2022-10-26

## 2022-10-26 RX ORDER — BLOOD-GLUCOSE METER
EACH MISCELLANEOUS
Qty: 1 KIT | Refills: 0 | Status: SHIPPED | OUTPATIENT
Start: 2022-10-26

## 2022-10-26 NOTE — ASSESSMENT & PLAN NOTE
-A1c 6 6% not at goal; aim for less than 6% with minimal hypoglycemia  -CMP within normal and baseline urine protein creatinine ratio within normal   -Follow up with dietitian   -Keep 3 day food log to review with dietitian   -Start self monitoring blood glucose fasting and 2 hours after start of each meal  Keep glucose log  Glucose goals: fasting 60-90 mg/dL, 140 mg/dL or less 1 hour post meals, and 120 mg/dL or less 2 hours post meal    -Report glucose readings weekly via [facility]   -Start GDM 2200 calorie meal plan with 3 meals and 3 snacks including recommended combination of carb, protein and fat per meal/snack   -Please eat meal or snack every 2-3 5 hours while awake   -No more than 8 to 10 hours of fasting overnight   -Stay active if no restriction from your OB, walk up to 30 minutes a day  -Always have glucose available to treat hypoglycemia  Use 15:15 rule  Refer to hypoglycemia patient education sheet  Test blood sugar when experiencing signs and symptoms of hypoglycemia and prior to driving    -Continue prenatal vitamin as recommended   -Continue follow-up with your OB and MFM as recommended   -Stay in close contact with diabetes education team   -Insulin requirements during pregnancy; basal/bolus concept and Metformin discussed  -Very important to maintain tight glucose control during pregnancy to decrease risk factors including fetal macrosomia; birth injury; risk of ; polyhydramnios; pre-term labor; pre-eclampsia;  hypoglycemia; jaundice and stillbirth  -Diabetes and pregnancy booklet; diet plan and hypoglycemia patient education                  Lab Results   Component Value Date    HGBA1C 6 6 (H) 10/03/2022

## 2022-10-26 NOTE — PATIENT INSTRUCTIONS
-A1c 6 6% not at goal; aim for less than 6% with minimal hypoglycemia  -CMP within normal and baseline urine protein creatinine ratio within normal   -Follow up with dietitian   -Keep 3 day food log to review with dietitian   -Start self monitoring blood glucose fasting and 2 hours after start of each meal  Keep glucose log  Glucose goals: fasting 60-90 mg/dL, 140 mg/dL or less 1 hour post meals, and 120 mg/dL or less 2 hours post meal    -Report glucose readings weekly via [employer]   -Start GDM 2200 calorie meal plan with 3 meals and 3 snacks including recommended combination of carb, protein and fat per meal/snack   -Please eat meal or snack every 2-3 5 hours while awake   -No more than 8 to 10 hours of fasting overnight   -Stay active if no restriction from your OB, walk up to 30 minutes a day  -Always have glucose available to treat hypoglycemia  Use 15:15 rule  Refer to hypoglycemia patient education sheet  Test blood sugar when experiencing signs and symptoms of hypoglycemia and prior to driving    -Continue prenatal vitamin as recommended   -Continue follow-up with your OB and MFM as recommended   -Stay in close contact with diabetes education team   -Insulin requirements during pregnancy; basal/bolus concept and Metformin discussed  -Very important to maintain tight glucose control during pregnancy to decrease risk factors including fetal macrosomia; birth injury; risk of ; polyhydramnios; pre-term labor; pre-eclampsia;  hypoglycemia; jaundice and stillbirth  -Diabetes and pregnancy booklet; diet plan and hypoglycemia patient education

## 2022-10-26 NOTE — ASSESSMENT & PLAN NOTE
-Pre-pregnancy weight 240 lbs  -Current weight 238 lbs  -Recommended weight gain 11 to 20 lbs  -Start GDM diet    -Follow up with dietitian

## 2022-10-26 NOTE — PROGRESS NOTES
Virtual Regular Visit    Verification of patient location:[place]    Patient is located in the following state in which I hold an active license [place]      Assessment/Plan:    Problem List Items Addressed This Visit        Endocrine    Pre-existing type 2 diabetes mellitus during pregnancy in second trimester - Primary     -A1c 6 6% not at goal; aim for less than 6% with minimal hypoglycemia  -CMP within normal and baseline urine protein creatinine ratio within normal   -Follow up with dietitian   -Keep 3 day food log to review with dietitian   -Start self monitoring blood glucose fasting and 2 hours after start of each meal  Keep glucose log  Glucose goals: fasting 60-90 mg/dL, 140 mg/dL or less 1 hour post meals, and 120 mg/dL or less 2 hours post meal    -Report glucose readings weekly via [employer]   -Start GDM 2200 calorie meal plan with 3 meals and 3 snacks including recommended combination of carb, protein and fat per meal/snack   -Please eat meal or snack every 2-3 5 hours while awake   -No more than 8 to 10 hours of fasting overnight   -Stay active if no restriction from your OB, walk up to 30 minutes a day  -Always have glucose available to treat hypoglycemia  Use 15:15 rule  Refer to hypoglycemia patient education sheet  Test blood sugar when experiencing signs and symptoms of hypoglycemia and prior to driving    -Continue prenatal vitamin as recommended   -Continue follow-up with your OB and MFM as recommended   -Stay in close contact with diabetes education team   -Insulin requirements during pregnancy; basal/bolus concept and Metformin discussed  -Very important to maintain tight glucose control during pregnancy to decrease risk factors including fetal macrosomia; birth injury; risk of ; polyhydramnios; pre-term labor; pre-eclampsia;  hypoglycemia; jaundice and stillbirth  -Diabetes and pregnancy booklet; diet plan and hypoglycemia patient education                  Lab Results   Component Value Date    HGBA1C 6 6 (H) 10/03/2022            Relevant Medications    Blood Glucose Monitoring Suppl (OneTouch Verio Flex System) w/Device KIT    OneTouch Delica Lancets 09S MISC    OneTouch Verio test strip    metFORMIN (GLUCOPHAGE) 1000 MG tablet    Other Relevant Orders    Mychart glucose flowsheet       Other    14 weeks gestation of pregnancy    Relevant Medications    Blood Glucose Monitoring Suppl (OneTouch Verio Flex System) w/Device KIT    OneTouch Delica Lancets 76L MISC    OneTouch Verio test strip    metFORMIN (GLUCOPHAGE) 1000 MG tablet    Other Relevant Orders    Mychart glucose flowsheet    Obesity affecting pregnancy, antepartum     -Pre-pregnancy weight 240 lbs  -Current weight 238 lbs  -Recommended weight gain 11 to 20 lbs  -Start GDM diet    -Follow up with dietitian  Relevant Medications    Blood Glucose Monitoring Suppl (OneTouch Verio Flex System) w/Device KIT    OneTouch Delica Lancets 29J MISC    OneTouch Verio test strip    metFORMIN (GLUCOPHAGE) 1000 MG tablet    Other Relevant Orders    Mychart glucose flowsheet    BMI 38 0-38 9,adult    Relevant Medications    Blood Glucose Monitoring Suppl (OneTouch Verio Flex System) w/Device KIT    OneTouch Delica Lancets 54H MISC    OneTouch Verio test strip    metFORMIN (GLUCOPHAGE) 1000 MG tablet    Other Relevant Orders    Mychart glucose flowsheet               Reason for visit is   Chief Complaint   Patient presents with   • Virtual Regular Visit   • Diabetes Type 2   • Patient Education        Encounter provider Patricia Singer, [facility]    Provider located at [address] [place] [address] Drive [address] [place] [id number] [phone]      Recent Visits  No visits were found meeting these conditions    Showing recent visits within past 7 days and meeting all other requirements  Today's Visits  Date Type Provider Dept   10/26/22 Telephone Patricia Singer, [address] [facility],[address]   10/26/22 Telemedicine Karma Nieves [place], [facility] today's visits and meeting all other requirements  Future Appointments  No visits were found meeting these conditions  Showing future appointments within next 150 days and meeting all other requirements       The patient was identified by name and date of birth  Erika Pepe was informed that this is a telemedicine visit and that the visit is being conducted through the [employer]  She agrees to proceed     My office door was closed  No one else was in the room  She acknowledged consent and understanding of privacy and security of the video platform  The patient has agreed to participate and understands they can discontinue the visit at any time  Patient is aware this is a billable service  Subjective  Erika Pepe is a 28 y o  female  14 3/7 weeks gestation T2DM diagnosed  with A1c 10%, recent A1c 6 6%  History of GDM with 3 yo and 3 yo  On Metformin 1000 mg with meals twice a day  Is not currently testing glucose  Eats 3 meals and 2 snacks a day  Having issues with nausea/vomitting, mostly in AM, on Zofran  History of pre-eclampsia with first pregnancy; started baby aspirin  Needs eye exam  Familiar with vial and syringe for insulin  Quick insulin pen reference sheet [facility]     Past Medical History:   Diagnosis Date   • Asthma     no meds/inhaler  in remission   • Depression     bipolar  300 mg serroquel  80 mg prozac   • Gestational diabetes     both pregnancies   • Preeclampsia     right after delivery with first 2018   • Type 2 diabetes mellitus ([address] )        • Varicella     had vaccines       Past Surgical History:   Procedure Laterality Date   •  SECTION      2018   • LAPAROSCOPY      left salpingectomy        Current Outpatient Medications   Medication Sig Dispense Refill   • Aspirin Low Dose 81 MG chewable tablet Chew 162 mg daily     • Blood Glucose Monitoring Suppl (OneTouch Verio Flex System) w/Device KIT Dispense 1 kit per insurance formulary  1 kit 0   • Blood Glucose Monitoring Suppl (OneTouch Verio Reflect) w/Device KIT DX: E11 9, THREE TIMES A DAY     • FLUoxetine (PROzac) 40 MG capsule Take 80 mg by mouth daily     • Lancets (onetouch ultrasoft) lancets TESTING 3X DAILY DX E11 9     • metFORMIN (GLUCOPHAGE) 1000 MG tablet Take 1 tablet (1,000 mg total) by mouth 2 (two) times a day with meals 60 tablet 6   • ondansetron (Zofran ODT) 4 mg disintegrating tablet Take 1 tablet (4 mg total) by mouth every 6 (six) hours as needed for nausea or vomiting 20 tablet 0   • OneTouch Delica Lancets 97J MISC Use 6 a day or as directed  T2DM and pregnancy  100 each 6   • OneTouch Verio test strip Test 6 times a day and as instructed  T2DM and pregnancy  150 each 6   • Prenatal MV-Min-Fe Fum-FA-DHA (PRENATAL 1 PO) Take by mouth     • QUEtiapine Fumarate (SEROQUEL PO) Take 300 mg by mouth       No current facility-administered medications for this visit  No Known Allergies    Review of Systems   Constitutional: Positive for fatigue  Negative for fever  HENT: Negative for congestion, sore throat and trouble swallowing  Eyes: Negative for visual disturbance  Respiratory: Negative for cough and shortness of breath  Cardiovascular: Negative for chest pain, palpitations and leg swelling  Gastrointestinal: Positive for nausea and vomiting  Negative for constipation and diarrhea  Endocrine: Negative for polydipsia, polyphagia and polyuria  Genitourinary: Negative for difficulty urinating and vaginal bleeding  Neurological: Negative for numbness and headaches  Psychiatric/Behavioral: Negative for sleep disturbance  Video Exam    Vitals:    10/26/22 1325   Weight: 108 kg (238 lb)   Height: 5' 6" (1 676 m)       Physical Exam  HENT:      Head: Normocephalic  Nose: Nose normal    Eyes:      Pupils: Pupils are equal, round, and reactive to light     Pulmonary:      Effort: Pulmonary effort is normal    Musculoskeletal:      Cervical back: Normal range of motion  Neurological:      Mental Status: She is alert and oriented to person, place, and time  Psychiatric:         Mood and Affect: Mood normal          Behavior: Behavior normal          Thought Content:  Thought content normal          Judgment: Judgment normal           I spent 60 minutes with patient today in which greater than 50% of the time was spent in counseling/coordination of care regarding reviewing chart; plan of care; patient education; etc

## 2022-10-27 ENCOUNTER — TELEPHONE (OUTPATIENT)
Dept: PERINATAL CARE | Facility: CLINIC | Age: 32
End: 2022-10-27

## 2022-10-27 NOTE — TELEPHONE ENCOUNTER
Called and lvm for pt letting them know their appt for 10/28 with MFM needs to be cancelled as she is over 13 w 6 d for the nuchal ultrasound  Pt already has same appointment scheduled for 11/18 but they will be able to see the baby better by that time  Told pt to call us back with any questions or concerns

## 2022-10-28 ENCOUNTER — CLINICAL SUPPORT (OUTPATIENT)
Dept: PERINATAL CARE | Facility: OTHER | Age: 32
End: 2022-10-28

## 2022-10-28 ENCOUNTER — APPOINTMENT (OUTPATIENT)
Dept: LAB | Facility: HOSPITAL | Age: 32
End: 2022-10-28
Payer: COMMERCIAL

## 2022-10-28 DIAGNOSIS — Z33.1 PREGNANT STATE, INCIDENTAL: Primary | ICD-10-CM

## 2022-10-28 DIAGNOSIS — Z33.1 PREGNANT STATE, INCIDENTAL: ICD-10-CM

## 2022-10-28 DIAGNOSIS — Z36.9 UNSPECIFIED ANTENATAL SCREENING: ICD-10-CM

## 2022-10-28 PROCEDURE — 36415 COLL VENOUS BLD VENIPUNCTURE: CPT

## 2022-11-07 ENCOUNTER — ROUTINE PRENATAL (OUTPATIENT)
Dept: OBGYN CLINIC | Age: 32
End: 2022-11-07

## 2022-11-07 VITALS
SYSTOLIC BLOOD PRESSURE: 108 MMHG | BODY MASS INDEX: 37.61 KG/M2 | WEIGHT: 234 LBS | HEIGHT: 66 IN | DIASTOLIC BLOOD PRESSURE: 76 MMHG

## 2022-11-07 DIAGNOSIS — O99.210 OBESITY AFFECTING PREGNANCY, ANTEPARTUM: ICD-10-CM

## 2022-11-07 DIAGNOSIS — Z33.1 PREGNANT STATE, INCIDENTAL: ICD-10-CM

## 2022-11-07 DIAGNOSIS — O34.219 PREVIOUS CESAREAN DELIVERY, ANTEPARTUM: ICD-10-CM

## 2022-11-07 DIAGNOSIS — O24.112 PRE-EXISTING TYPE 2 DIABETES MELLITUS DURING PREGNANCY IN SECOND TRIMESTER: ICD-10-CM

## 2022-11-07 DIAGNOSIS — Z36.9 ANTENATAL SCREENING ENCOUNTER: ICD-10-CM

## 2022-11-07 DIAGNOSIS — Z3A.16 16 WEEKS GESTATION OF PREGNANCY: Primary | ICD-10-CM

## 2022-11-07 DIAGNOSIS — O09.299 HISTORY OF PRE-ECLAMPSIA IN PRIOR PREGNANCY, CURRENTLY PREGNANT: ICD-10-CM

## 2022-11-07 LAB
SL AMB  POCT GLUCOSE, UA: NEGATIVE
SL AMB POCT URINE PROTEIN: NEGATIVE

## 2022-11-07 NOTE — PROGRESS NOTES
Problem   History of Pre-Eclampsia in Prior Pregnancy, Currently Pregnant    Developed PP     16 Weeks Gestation of Pregnancy    Blood Type: B positive  Antibody negative  Pap Not on file  GC/CT -  collected  PN1 Labs- WNL H&H- 14   0  baseline preE labs wnl   28 Week Labs-  COVID vaccine-   Flu vaccine - declined   Blue folder- Reviewed    Genetic screening- plans to complete NIPT  AFP- ordered   Level 1- 10/28  Has VV with DP 10/21  Level 2-     Yellow folder-  TDAP -   Delivery consent-  Breast pump -   Pediatrician -    Perineal massage -  GBS swab -   IOL -       16 weeks gestation of pregnancy  Anam Buchanan is a 28 y o  [id number] 16w1d here for routine prenatal care  Prepregnancy BMI 38 76 with a goal weight gain 5 kg (11 lb)-9 kg (19 lb)  TWG -2 722 kg (-6 lb)  Feels well  Denies LOF/CTX/VB  +FM  No concerns   labor precautions reviewed  Encouraged adequate hydration and nutrition  Pregnancy Essential guide and [facility] and Me center web site recommended      History of pre-eclampsia in prior pregnancy, currently pregnant  Started on  mg daily til 36 weeks

## 2022-11-07 NOTE — PATIENT INSTRUCTIONS
Pregnancy at 15 to 18 Weeks   AMBULATORY CARE:   What changes are happening to your body:  Now that you are in your second trimester, you have more energy  You may also feel hungrier than usual  You may start to experience other symptoms, such as heartburn or dizziness  You may be gaining about ½ to 1 pound a week, and your pregnancy is beginning to show  You may need to start wearing maternity clothes  Seek care immediately if:   You have pain or cramping in your abdomen or low back  You have heavy vaginal bleeding or clotting  You pass material that looks like tissue or large clots  Collect the material and bring it with you  Call your doctor or obstetrician if:   You cannot keep food or drinks down, and you are losing weight  You have light bleeding  You have chills or a fever  You have vaginal itching, burning, or pain  You have yellow, green, white, or foul-smelling vaginal discharge  You have pain or burning when you urinate, less urine than usual, or pink or bloody urine  You have questions or concerns about your condition or care  How to care for yourself at this stage of your pregnancy:       Manage heartburn  by eating 4 or 5 small meals each day instead of large meals  Avoid spicy foods  Avoid eating right before bedtime  Manage nausea and vomiting  Avoid fatty and spicy foods  Eat small meals throughout the day instead of large meals  Crys may help to decrease nausea  Ask your healthcare provider about other ways of decreasing nausea and vomiting  Eat a variety of healthy foods  Healthy foods include fruits, vegetables, whole-grain breads, low-fat dairy foods, beans, lean meats, and fish  Drink liquids as directed  Ask how much liquid to drink each day and which liquids are best for you  Limit caffeine to less than 200 milligrams each day  Limit your intake of fish to 2 servings each week   Choose fish low in mercury such as canned light tuna, shrimp, salmon, cod, or tilapia  Do not  eat fish high in mercury such as swordfish, tilefish, benigno mackerel, and shark  Take prenatal vitamins as directed  Your need for certain vitamins and minerals, such as folic acid, increases during pregnancy  Prenatal vitamins provide some of the extra vitamins and minerals you need  Prenatal vitamins may also help to decrease the risk of certain birth defects  Do not smoke  Smoking increases your risk of a miscarriage and other health problems during your pregnancy  Smoking can cause your baby to be born too early or weigh less at birth  Ask your healthcare provider for information if you need help quitting  Do not drink alcohol  Alcohol passes from your body to your baby through the placenta  It can affect your baby's brain development and cause fetal alcohol syndrome (FAS)  FAS is a group of conditions that causes mental, behavior, and growth problems  Talk to your healthcare provider before you take any medicines  Many medicines may harm your baby if you take them when you are pregnant  Do not take any medicines, vitamins, herbs, or supplements without first talking to your healthcare provider  Never use illegal or street drugs (such as marijuana or cocaine) while you are pregnant  Safety tips during pregnancy:   Avoid hot tubs and saunas  Do not use a hot tub or sauna while you are pregnant, especially during your first trimester  Hot tubs and saunas may raise your baby's temperature and increase the risk of birth defects  Avoid toxoplasmosis  This is an infection caused by eating raw meat or being around infected cat feces  It can cause birth defects, miscarriages, and other problems  Wash your hands after you touch raw meat  Make sure any meat is well-cooked before you eat it  Avoid raw eggs and unpasteurized milk  Use gloves or ask someone else to clean your cat's litter box while you are pregnant      Changes that are happening with your baby:  By 90 weeks, your baby may be about 6 inches long from the top of the head to the rump (baby's bottom)  Your baby may weigh about 11 ounces  You may be able to feel your baby's movement at about 18 weeks or later  The first movements may not be that noticeable  They may feel like a fluttering sensation  Your baby also makes sucking movements and can hear certain sounds  What you need to know about prenatal care:  During the first 28 weeks of your pregnancy, you will see your healthcare provider once a month  Your healthcare provider will check your blood pressure and weight  You may also need any of the following:  A urine test  may also be done to check for sugar and protein  These can be signs of gestational diabetes or infection  A blood test  may be done to check for anemia (low iron level)  Fundal height check  is a measurement of your uterus to check your baby's growth  This number is usually the same as the number of weeks that you have been pregnant  An ultrasound  may be done to check your baby's development  Your healthcare provider may be able to tell you what your baby's gender is during the ultrasound  Your baby's heart rate  will be checked  © Copyright [employer] 2022 Information is for End User's use only and may not be sold, redistributed or otherwise used for commercial purposes  All illustrations and images included in CareNotes® are the copyrighted property of [employer] M [employer]  or Shaina Morton   The above information is an  only  It is not intended as medical advice for individual conditions or treatments  Talk to your doctor, nurse or pharmacist before following any medical regimen to see if it is safe and effective for you  Alpha-1-fetoprotein measurement, serum   GENERAL INFORMATION:  What is this test?  This test measures alpha-fetoprotein (AFP) in blood  This test may assess an unborn baby for certain problems   Since AFP is a tumor marker, this test may also assess for certain types of cancer  What are other names for this test?  Serum alpha-fetoprotein  What are related tests? Chorionic gonadotropin, beta-subunit measurement  Diagnostic ultrasound of gravid uterus  Serum lactate dehydrogenase measurement  Ultrasonography of liver  Why do I need this test?  Laboratory tests may be done for many reasons  Tests are performed for routine health screenings or if a disease or toxicity is suspected  Lab tests may be used to determine if a medical condition is improving or worsening  Lab tests may also be used to measure the success or failure of a medication or treatment plan  Lab tests may be ordered for professional or legal reasons  You may need this test if you have:   Complication occurring during pregnancy  Liver carcinoma  Neural tube defect  Testicular cancer  When and how often should I have this test?  When and how often laboratory tests are done may depend on many factors  The timing of laboratory tests may rely on the results or completion of other tests, procedures, or treatments  Lab tests may be performed immediately in an emergency, or tests may be delayed as a condition is treated or monitored  A test may be suggested or become necessary when certain signs or symptoms appear  Due to changes in the way your body naturally functions through the course of a day, lab tests may need to be performed at a certain time of day  If you have prepared for a test by changing your food or fluid intake, lab tests may be timed in accordance with those changes  Timing of tests may be based on increased and decreased levels of medications, drugs or other substances in the body  The age or gender of the person being tested may affect when and how often a lab test is required  Chronic or progressive conditions may need ongoing monitoring through the use of lab tests  Conditions that worsen and improve may also need frequent monitoring   Certain tests may be repeated to obtain a series of results, or tests may need to be repeated to confirm or disprove results  Timing and frequency of lab tests may vary if they are performed for professional or legal reasons  How should I get ready for the test?  Venous blood:   Before having blood collected, tell the person drawing your blood if you are allergic to latex  Tell the healthcare worker if you have a medical condition or are using a medication or supplement that causes excessive bleeding  Also tell the healthcare worker if you have felt nauseated, lightheaded, or have fainted while having blood drawn in the past   If the test is for prenatal screening, tell the healthcare worker what pregnancy trimester you are in at the time of the test   Umbilical cord blood:   Ask the healthcare worker for information about how to prepare for this test    How is the test done? A sample of venous or umbilical cord blood may be collected for this test   Venous blood:   When a blood sample from a vein is needed, a vein in your arm is usually selected  A tourniquet (large rubber strap) may be secured above the vein  The skin over the vein will be cleaned, and a needle will be inserted  You will be asked to hold very still while your blood is collected  Blood will be collected into one or more tubes, and the tourniquet will be removed  When enough blood has been collected, the healthcare worker will take the needle out  Umbilical cord blood:   A method called percutaneous umbilical cord blood sampling may be used to collect an umbilical cord blood sample  For this method, a needle is inserted through the pregnant mother's abdomen and into the umbilical cord  Ultrasound is used to help guide the needle  Samples of the unborn baby’s umbilical cord blood are drawn out for testing  When enough blood is collected, the needle is removed  How will the test feel?   The amount of discomfort you feel will depend on many factors, including your sensitivity to pain  Communicate how you are feeling with the person doing the test  Inform the person doing the test if you feel that you cannot continue with the test   Venous blood:   During a blood draw, you may feel mild discomfort at the location where the blood sample is being collected  Umbilical cord blood: There are several different ways that a cord blood sample may be collected  Depending on the procedure used to obtain the sample, the test may be uncomfortable  Ask the healthcare worker to explain how the test may feel  What should I do after the test?  Venous blood:   After a blood sample is collected from your vein, a bandage, cotton ball, or gauze may be placed on the area where the needle was inserted  You may be asked to apply pressure to the area  Avoid strenuous exercise immediately after your blood draw  Contact your healthcare worker if you feel pain or see redness, swelling, or discharge from the puncture site  Umbilical cord blood:   Depending on the procedure used to obtain a sample of cord blood, there may be special instructions for you to follow  Ask the healthcare worker for any special instructions following this procedure  What are the risks? Blood: During a blood draw, a hematoma (blood-filled bump under the skin) or slight bleeding from the puncture site may occur  After a blood draw, a bruise or infection may occur at the puncture site  The person doing this test may need to perform it more than once  Talk to your healthcare worker if you have any concerns about the risks of this test   Umbilical cord blood: Before your baby is born, umbilical cord blood may be collected by a procedure called percutaneous umbilical cord blood sampling  This procedure is done by inserting a needle through the pregnant mother’s abdomen and into the umbilical cord   Risks of this procedure include bleeding from the puncture site, fetal bradycardia (the baby’s heart rate dropping abnormally low), infection, and thrombosis (blood clot) of the umbilical vein  Another risk of this procedure is an umbilical cord hematoma (a blood-filled pocket that puts pressure on the umbilical cord and can restrict flow of blood to the baby)  If you have a medical condition, or are using a medication or supplement that causes excessive bleeding, you are at a higher risk of bleeding from the puncture site  There is a risk that your baby will not survive the procedure  The person doing this procedure may need to perform it more than once  Talk to your healthcare worker if you have any concerns about the risks of having percutaneous umbilical cord blood sampling  What are normal results for this test?  Laboratory test results may vary depending on your age, gender, health history, the method used for the test, and many other factors  If your results are different from the results suggested below, this may not mean that you have a disease  Contact your healthcare worker if you have any questions  The following are considered to be normal results for this test:  Adults: <15 ng/mL (15 mcg/L)  Fetal blood (first trimester): Peak 200-400 mg/dL (2-4 g/L)  Pregnancy (2nd trimester):  14 weeks' gestation: Median 25 6 ng/mL (25 6 mcg/L)  15 weeks' gestation: Median 29 9 ng/mL (29 9 mcg/L)  16 weeks' gestation: Median 34 8 ng/mL (34 8 mcg/L)  17 weeks' gestation: Median 40 6 ng/mL (40 6 mcg/L)  18 weeks' gestation: Median 47 3 ng/mL (47 3 mcg/L)  19 weeks' gestation: Median 55 1 ng/mL (55 1 mcg/L)  20 weeks' gestation: Median 64 3 ng/mL (64 3 mcg/L)  21 weeks' gestation: Median 74 9 ng/mL (74 9 mcg/L)  The multiple of the median (MoM) value is adjusted for maternal weight, race, diabetes mellitus, and twin pregnancy   Normal values are approximately 15% higher for blacks or  Americans than for whites   Normal values are approximately 200% higher in women with twin pregnancy   What might affect my test results?   Results increased in (during pregnancy) : Underestimation of gestational age, based on recall of the last menstrual period, is a common cause of false results  Whenever the adjusted maternal serum multiple of the mean (MoM) alpha-fetoprotein is 2 or higher, fetal ultrasonography (biparietal diameter) is indicated to confirm gestational age   Comorbid hepatic disorder (eg, acute injury, fibrosis, chronic hepatitis)  Maternal blood sampled after insertion of amniocentesis needle  Results decreased in (during pregnancy): Type 2 diabetes mellitus  What follow up should I do after this test?  Ask your healthcare worker how you will be informed of the test results  You may be asked to call for results, schedule an appointment to discuss results, or notified of results by mail  Follow up care varies depending on many factors related to your test  Sometimes there is no follow up after you have been notified of test results  At other times follow up may be suggested or necessary  Some examples of follow up care include changes to medication or treatment plans, referral to a specialist, more or less frequent monitoring, and additional tests or procedures  Talk with your healthcare worker about any concerns or questions you have regarding follow up care or instructions  Where can I get more information? Related [address] on Birth Defects and Developmental Disabilities, [facility]  [address] - [email or web address]    CARE AGREEMENT:   You have the right to help plan your care  To help with this plan, you must learn about your health condition and how it may be treated  You can then discuss treatment options with your caregivers  Work with them to decide what care may be used to treat you  You always have the right to refuse treatment  © Copyright [employer] 2021  Information is for End User's use only and may not be sold, redistributed or otherwise used for commercial purposes   The above information is an  only  It is not intended as a substitute for medical advice for your individual conditions or treatments  Talk to your doctor, nurse or pharmacist before following any medical regimen to see if it is safe and effective for you

## 2022-11-07 NOTE — PROGRESS NOTES
Patient is here for prenatal ob visit today  No question's or concerns at this time  GA: 16w1d  Estimated Date of Delivery: 4/23/23  [id number]  Urine: Protein Negative / Glucose Negative  Denies loss of fluid, vaginal bleeding and contractions  Feels flutters   Pn1 labs are completed and up to date  Declines flu vaccine for duration of pregnancy

## 2022-11-07 NOTE — ASSESSMENT & PLAN NOTE
Elie Flores is a 28 y o  [id number] 16w1d here for routine prenatal care  Prepregnancy BMI 38 76 with a goal weight gain 5 kg (11 lb)-9 kg (19 lb)  TWG -2 722 kg (-6 lb)  Feels well  Denies LOF/CTX/VB  +FM  No concerns   labor precautions reviewed  Encouraged adequate hydration and nutrition  Pregnancy Essential guide and Baby and Me center web site recommended

## 2022-11-08 ENCOUNTER — TELEPHONE (OUTPATIENT)
Dept: PERINATAL CARE | Facility: CLINIC | Age: 32
End: 2022-11-08

## 2022-11-08 NOTE — TELEPHONE ENCOUNTER
No Show Documentation  Diabetes in Pregnancy Program    Jacqueline Wynn was a no show today (11/08/22) for Diabetes in Pregnancy Program -- Class 2    • Pt was instructed to reach out to Brian Clarke at [phone] to reschedule    • Staff Message Sent for [address], [place]   Diabetes Educator  [address] [facility], [facility]

## 2022-11-14 NOTE — PROGRESS NOTES
Please refer to the [facility] ultrasound report in Ob Procedures for additional information regarding today's visit 
[facility]

## 2022-11-15 ENCOUNTER — ROUTINE PRENATAL (OUTPATIENT)
Dept: PERINATAL CARE | Facility: OTHER | Age: 32
End: 2022-11-15

## 2022-11-15 VITALS
DIASTOLIC BLOOD PRESSURE: 62 MMHG | WEIGHT: 234 LBS | SYSTOLIC BLOOD PRESSURE: 118 MMHG | HEART RATE: 93 BPM | HEIGHT: 66 IN | BODY MASS INDEX: 37.61 KG/M2

## 2022-11-15 DIAGNOSIS — O09.292 HX OF PREECLAMPSIA, PRIOR PREGNANCY, CURRENTLY PREGNANT, SECOND TRIMESTER: ICD-10-CM

## 2022-11-15 DIAGNOSIS — O34.219 PREVIOUS CESAREAN DELIVERY, ANTEPARTUM: ICD-10-CM

## 2022-11-15 DIAGNOSIS — O24.112 PRE-EXISTING TYPE 2 DIABETES MELLITUS DURING PREGNANCY IN SECOND TRIMESTER: Primary | ICD-10-CM

## 2022-11-15 DIAGNOSIS — Z3A.17 17 WEEKS GESTATION OF PREGNANCY: ICD-10-CM

## 2022-11-15 DIAGNOSIS — O99.212 MATERNAL OBESITY, ANTEPARTUM, SECOND TRIMESTER: ICD-10-CM

## 2022-11-15 NOTE — LETTER
November 15, 2022     [facility], [address] [facility] [address]   [address] [address]    Patient: Janis Wilder   YOB: 1990   Date of Visit: 11/15/2022       Dear Dr Keanu Joe: Thank you for referring Janis Wilder to me for evaluation  Below are my notes for this consultation  If you have questions, please do not hesitate to call me  I look forward to following your patient along with you  Sincerely,        Karlene Alcantara MD        CC: No Recipients  Karlene Alcantara MD  11/14/2022  4:25 PM  Sign when Signing Visit  Please refer to the [facility] ultrasound report in Ob Procedures for additional information regarding today's visit

## 2022-11-29 ENCOUNTER — TELEMEDICINE (OUTPATIENT)
Dept: PERINATAL CARE | Facility: CLINIC | Age: 32
End: 2022-11-29

## 2022-11-29 VITALS — HEIGHT: 66 IN | WEIGHT: 234 LBS | BODY MASS INDEX: 37.61 KG/M2

## 2022-11-29 DIAGNOSIS — Z3A.19 19 WEEKS GESTATION OF PREGNANCY: ICD-10-CM

## 2022-11-29 DIAGNOSIS — O24.112 PRE-EXISTING TYPE 2 DIABETES MELLITUS DURING PREGNANCY IN SECOND TRIMESTER: Primary | ICD-10-CM

## 2022-11-29 DIAGNOSIS — O99.210 OBESITY AFFECTING PREGNANCY, ANTEPARTUM: ICD-10-CM

## 2022-11-29 RX ORDER — INSULIN GLARGINE 100 [IU]/ML
20 INJECTION, SOLUTION SUBCUTANEOUS
Qty: 15 ML | Refills: 0 | Status: SHIPPED | OUTPATIENT
Start: 2022-11-29

## 2022-11-29 NOTE — PROGRESS NOTES
Virtual Regular Visit    Verification of patient location:[place]    Patient is located in the following state in which I hold an active license [place]      Assessment/Plan:    Problem List Items Addressed This Visit        Endocrine    Pre-existing type 2 diabetes mellitus during pregnancy in second trimester - Primary     -Last A1c 6 6%; A1c goal is less than 6% with minimal hypoglycemia  -Repeat A1c    -Continue Metformin 1000 mg twice a day with meals   -Due to FBS>90; start Lantus 20 units at 9:30 PM daily  Set an alarm as a daily reminder   -Always have bedtime snack combination of carbohydrates and protein   -No more than 8 to 10 hours of fasting overnight   -Continue SMBG fasting; 2 hours post start of each meal and with hypoglycemia   -Follow up with dietitian as scheduled; keep 3 day diet log    -Send MyChart every Monday and Thursday until fasting glucose readings are 90 or less    -Always have glucose available for hypoglycemia; use 15 by 15 rule  -Continue walking 30-40 minutes a day 3 to 4 times a week if no restrictions from your OB  -Fetal growth ultrasound as scheduled then every 4 to 6 weeks as recommended   -Fetal echo 22-24 weeks gestation    -Continue follow up with OB and MFM as recommended    -At 32 weeks gestation, NST twice a week and MELIDA weekly    -At 36 weeks gestation, stop baby aspirin   -Continue close contact with diabetes  -Schedule eye exam    -Schedule follow up with endocrinologist or PCP for end of April or beginning of May 2023       Lab Results   Component Value Date    HGBA1C 6 6 (H) 10/03/2022            Relevant Medications    Insulin Glargine Solostar (Lantus SoloStar) 100 UNIT/ML SOPN    Insulin Pen Needle 31G X 5 MM MISC    Other Relevant Orders    Hemoglobin A1C       Other    19 weeks gestation of pregnancy    Relevant Medications    Insulin Glargine Solostar (Lantus SoloStar) 100 UNIT/ML SOPN    Insulin Pen Needle 31G X 5 MM MISC    Other Relevant Orders    Hemoglobin A1C Obesity affecting pregnancy, antepartum     -Pre-pregnancy weight 240 lbs  -Current weight 234 lbs  -TWL -6 lbs  -Recommended weight gain 11 to 20 lbs  -Continue GDM diet   -Schedule an appointment with dietitian  Relevant Medications    Insulin Glargine Solostar (Lantus SoloStar) 100 UNIT/ML SOPN    Insulin Pen Needle 31G X 5 MM MISC    Other Relevant Orders    Hemoglobin A1C    BMI 37 0-37 9, adult    Relevant Medications    Insulin Glargine Solostar (Lantus SoloStar) 100 UNIT/ML SOPN    Insulin Pen Needle 31G X 5 MM MISC    Other Relevant Orders    Hemoglobin A1C            Reason for visit is   Chief Complaint   Patient presents with   • Virtual Regular Visit   • Diabetes Type 2        Encounter provider Francisco Juares, [address] [place] [address]    Provider located at [address] [place] [id number] [phone]      Recent Visits  Date Type Provider Dept   22 [id number] [facility], [facility] recent visits within past 7 days and meeting all other requirements  Future Appointments  No visits were found meeting these conditions  Showing future appointments within next 150 days and meeting all other requirements       The patient was identified by name and date of birth  Pau Martinez was informed that this is a telemedicine visit and that the visit is being conducted through the [employer]  She agrees to proceed     My office door was closed  No one else was in the room  She acknowledged consent and understanding of privacy and security of the video platform  The patient has agreed to participate and understands they can discontinue the visit at any time  Patient is aware this is a billable service  Subjective  Pau Martinez is a 28 y o  female  19 2/7 weeks gestation T2DM on Metformin 1000 mg twice a day  History of GDM, has 3 yo and 3 yo   Eating 3 meals and 2 snacks a day; encouraged to always have combination of carbohydrates and protein  Nausea and vomiting improved  Walking 30-40 minutes a day 3 to 4 times a week  Testing fasting and 2 hours post start of each meal; in glucose flowsheet inputting incorrectly readings  FBS above goal and needs to start basal insulin; history of using vial and syringe; is familiar with injections  Needs to watch video again for insulin pen education and quick reference sheet provided via [employer] after last visit  Needs to schedule eye exam  Pending dietitian follow up  HPI     Past Medical History:   Diagnosis Date   • Asthma     no meds/inhaler  in remission   • Depression     bipolar  300 mg serroquel  80 mg prozac   • Gestational diabetes     both pregnancies   • Preeclampsia     right after delivery with first 2018   • Type 2 diabetes mellitus ([facility] [address] )        • Varicella     had vaccines       Past Surgical History:   Procedure Laterality Date   •  SECTION      2018   • LAPAROSCOPY      left salpingectomy        Current Outpatient Medications   Medication Sig Dispense Refill   • Insulin Glargine Solostar (Lantus SoloStar) 100 UNIT/ML SOPN Inject 0 2 mL (20 Units total) under the skin daily at bedtime At 9:30 PM  To be titrated  T2DM and pregnancy  15 mL 0   • Insulin Pen Needle 31G X 5 MM MISC Inject under the skin daily at bedtime Use one a day or as directed  100 each 1   • Aspirin Low Dose 81 MG chewable tablet Chew 162 mg daily     • Blood Glucose Monitoring Suppl (OneTouch Verio Flex System) w/Device KIT Dispense 1 kit per insurance formulary   1 kit 0   • Blood Glucose Monitoring Suppl (OneTouch Verio Reflect) w/Device KIT DX: E11 9, THREE TIMES A DAY     • FLUoxetine (PROzac) 40 MG capsule Take 80 mg by mouth daily     • Lancets (onetouch ultrasoft) lancets TESTING 3X DAILY DX E11 9     • metFORMIN (GLUCOPHAGE) 1000 MG tablet TAKE 1 TABLET BY MOUTH TWICE A DAY WITH MEALS 180 tablet 1   • ondansetron (Zofran ODT) 4 mg disintegrating tablet Take 1 tablet (4 mg total) by mouth every 6 (six) hours as needed for nausea or vomiting 20 tablet 0   • OneTouch Delica Lancets 74U MISC Use 6 a day or as directed  T2DM and pregnancy  100 each 6   • OneTouch Verio test strip Test 6 times a day and as instructed  T2DM and pregnancy  150 each 6   • Prenatal MV-Min-Fe Fum-FA-DHA (PRENATAL 1 PO) Take by mouth     • QUEtiapine Fumarate (SEROQUEL PO) Take 300 mg by mouth       No current facility-administered medications for this visit  No Known Allergies    Review of Systems   Constitutional: Positive for fatigue  Negative for fever  HENT: Negative for congestion  Eyes: Negative for visual disturbance  Needs to schedule eye exam     Respiratory: Negative for cough and shortness of breath  Cardiovascular: Negative for chest pain and palpitations  Gastrointestinal: Positive for nausea (improved ) and vomiting (improved greatly)  Negative for constipation and diarrhea  Endocrine: Positive for polyuria  Negative for polydipsia and polyphagia  Genitourinary: Negative for difficulty urinating and vaginal bleeding  Neurological: Negative for headaches  Psychiatric/Behavioral: Negative for sleep disturbance  Video Exam  Refer to glucose flowsheet for readings  FBS>90 and 2 hours post meal readings 120 or less  Vitals:    11/29/22 1323   Weight: 106 kg (234 lb)   Height: 5' 6" (1 676 m)       Physical Exam  HENT:      Head: Normocephalic  Nose: Nose normal    Eyes:      Conjunctiva/sclera: Conjunctivae normal    Pulmonary:      Effort: Pulmonary effort is normal    Musculoskeletal:      Cervical back: Normal range of motion  Neurological:      Mental Status: She is alert and oriented to person, place, and time  Psychiatric:         Behavior: Behavior normal          Thought Content: Thought content normal          Judgment: Judgment normal           I spent 32 minutes directly with the patient during this visit

## 2022-11-29 NOTE — ASSESSMENT & PLAN NOTE
-Last A1c 6 6%; A1c goal is less than 6% with minimal hypoglycemia  -Repeat A1c    -Continue Metformin 1000 mg twice a day with meals   -Due to FBS>90; start Lantus 20 units at 9:30 PM daily  Set an alarm as a daily reminder   -Always have bedtime snack combination of carbohydrates and protein   -No more than 8 to 10 hours of fasting overnight   -Continue SMBG fasting; 2 hours post start of each meal and with hypoglycemia   -Follow up with dietitian as scheduled; keep 3 day diet log    -Send MyChart every Monday and Thursday until fasting glucose readings are 90 or less    -Always have glucose available for hypoglycemia; use 15 by 15 rule  -Continue walking 30-40 minutes a day 3 to 4 times a week if no restrictions from your OB  -Fetal growth ultrasound as scheduled then every 4 to 6 weeks as recommended   -Fetal echo 22-24 weeks gestation    -Continue follow up with OB and MFM as recommended    -At 32 weeks gestation, NST twice a week and MELIDA weekly    -At 36 weeks gestation, stop baby aspirin   -Continue close contact with diabetes  -Schedule eye exam    -Schedule follow up with endocrinologist or PCP for end of April or beginning of May 2023       Lab Results   Component Value Date    HGBA1C 6 6 (H) 10/03/2022

## 2022-11-29 NOTE — ASSESSMENT & PLAN NOTE
-Pre-pregnancy weight 240 lbs  -Current weight 234 lbs  -TWL -6 lbs  -Recommended weight gain 11 to 20 lbs  -Continue GDM diet   -Schedule an appointment with dietitian

## 2022-11-29 NOTE — PATIENT INSTRUCTIONS
-Last A1c 6 6%; A1c goal is less than 6% with minimal hypoglycemia  -Repeat A1c    -Continue Metformin 1000 mg twice a day with meals   -Due to FBS>90; start Lantus 20 units at 9:30 PM daily  Set an alarm as a daily reminder   -Always have bedtime snack combination of carbohydrates and protein   -No more than 8 to 10 hours of fasting overnight   -Continue SMBG fasting; 2 hours post start of each meal and with hypoglycemia   -Follow up with dietitian as scheduled; keep 3 day diet log    -Send MyChart every Monday and Thursday until fasting glucose readings are 90 or less    -Always have glucose available for hypoglycemia; use 15 by 15 rule  -Continue walking 30-40 minutes a day 3 to 4 times a week if no restrictions from your OB  -Fetal growth ultrasound as scheduled then every 4 to 6 weeks as recommended   -Fetal echo 22-24 weeks gestation    -Continue follow up with OB and MFM as recommended    -At 32 weeks gestation, NST twice a week and MELIDA weekly    -At 36 weeks gestation, stop baby aspirin   -Continue close contact with diabetes  -Schedule eye exam    -Schedule follow up with endocrinologist or PCP for end of April or beginning of May 2023

## 2022-12-05 ENCOUNTER — ROUTINE PRENATAL (OUTPATIENT)
Dept: OBGYN CLINIC | Facility: CLINIC | Age: 32
End: 2022-12-05

## 2022-12-05 VITALS
BODY MASS INDEX: 37.77 KG/M2 | HEIGHT: 66 IN | WEIGHT: 235 LBS | DIASTOLIC BLOOD PRESSURE: 84 MMHG | SYSTOLIC BLOOD PRESSURE: 122 MMHG

## 2022-12-05 DIAGNOSIS — Z3A.20 20 WEEKS GESTATION OF PREGNANCY: Primary | ICD-10-CM

## 2022-12-05 DIAGNOSIS — O09.299 HISTORY OF PRE-ECLAMPSIA IN PRIOR PREGNANCY, CURRENTLY PREGNANT: ICD-10-CM

## 2022-12-05 DIAGNOSIS — Z3A.19 19 WEEKS GESTATION OF PREGNANCY: ICD-10-CM

## 2022-12-05 DIAGNOSIS — O24.112 PRE-EXISTING TYPE 2 DIABETES MELLITUS DURING PREGNANCY IN SECOND TRIMESTER: ICD-10-CM

## 2022-12-05 DIAGNOSIS — O34.219 PREVIOUS CESAREAN DELIVERY, ANTEPARTUM: ICD-10-CM

## 2022-12-05 LAB
SL AMB  POCT GLUCOSE, UA: NORMAL
SL AMB POCT URINE PROTEIN: NORMAL

## 2022-12-05 NOTE — ASSESSMENT & PLAN NOTE
Following with [facility] for Diabetes management  Follow up imaging this week  Discussed musculoskeletal changes of pregnancy  Good fetal movement noted

## 2022-12-05 NOTE — PROGRESS NOTES
Assessment/Plan:    20 weeks gestation of pregnancy  Following with [facility] for Diabetes management  Follow up imaging this week  Discussed musculoskeletal changes of pregnancy  Good fetal movement noted  Problem   Previous  Delivery, Antepartum    1st preg at term Failed IOL did not go past 4cm  Csection x 2, plan for repeat 39 weeks - 2022 10AM [facility] Staff       History of Pre-Eclampsia in Prior Pregnancy, Currently Pregnant    Developed PP - low dose aspirin until 36 weeks     Pre-Existing Type 2 Diabetes Mellitus During Pregnancy in Second Trimester    a1c at 11 wks= 6 6  On Metformin 1000 mg twice a day  Serial growth, APFS late third trimester     20 Weeks Gestation of Pregnancy    Blood Type: B positive  Antibody negative  Pap Not on file  GC/CT -  collected  PN1 Labs- WNL H&H- 14   0  baseline preE labs wnl   28 Week Labs-  COVID vaccine-   Flu vaccine - declined   Blue folder- Reviewed    Genetic screening- plans to complete NIPT  AFP- ordered   Level 1- 10/28  Has VV with DP 10/21  Level 2-     Yellow folder-  TDAP -   Delivery consent-  Breast pump -   Pediatrician -    Perineal massage -  GBS swab -   IOL -          Diagnoses and all orders for this visit:    20 weeks gestation of pregnancy  -     POCT urine dip    19 weeks gestation of pregnancy    Pre-existing type 2 diabetes mellitus during pregnancy in second trimester    BMI 37 0-37 9, adult    History of pre-eclampsia in prior pregnancy, currently pregnant    Previous  delivery, antepartum          Subjective:      Patient ID: Karan Araujo is a 28 y o  female  Patient is here for prenatal ob visit today  No question's or concerns at this time  GA: 20w1d  CANDELARIA: 23  [id number]  Urine: Protein neg / Glucose neg  Denies loss of fluid, vaginal bleeding and contractions  Good fetal movement     Patient is having a girl  Labs utd  -AFP not done  Declines flu  Blue folder given    Patient reports round ligament pain, nausea and vomiting

## 2022-12-06 ENCOUNTER — TELEMEDICINE (OUTPATIENT)
Facility: HOSPITAL | Age: 32
End: 2022-12-06

## 2022-12-06 DIAGNOSIS — Z86.32 HISTORY OF GESTATIONAL DIABETES: ICD-10-CM

## 2022-12-06 DIAGNOSIS — O24.112 PRE-EXISTING TYPE 2 DIABETES MELLITUS DURING PREGNANCY IN SECOND TRIMESTER: Primary | ICD-10-CM

## 2022-12-06 DIAGNOSIS — Z3A.20 20 WEEKS GESTATION OF PREGNANCY: ICD-10-CM

## 2022-12-06 DIAGNOSIS — O99.210 OBESITY AFFECTING PREGNANCY, ANTEPARTUM: ICD-10-CM

## 2022-12-06 NOTE — PROGRESS NOTES
Virtual Regular Visit    Verification of patient location:    Patient is located in the following state in which I hold an active license [place]      Assessment/Plan:    Problem List Items Addressed This Visit        Endocrine    Pre-existing type 2 diabetes mellitus during pregnancy in second trimester - Primary       Other    20 weeks gestation of pregnancy    Obesity affecting pregnancy, antepartum   Other Visit Diagnoses     History of gestational diabetes                   Reason for visit is   Chief Complaint   Patient presents with   • Patient Education   • Diabetes Type 2   • Virtual Regular Visit        Encounter provider Pricila Gonzalez    Provider located at [facility] [id number]      Recent Visits  No visits were found meeting these conditions  Showing recent visits within past 7 days and meeting all other requirements  Today's Visits  Date Type Provider Dept   22 Telemedicine Pricila Cueto    Showing today's visits and meeting all other requirements  Future Appointments  No visits were found meeting these conditions  Showing future appointments within next 150 days and meeting all other requirements       The patient was identified by name and date of birth  Rebecca Dee was informed that this is a telemedicine visit and that the visit is being conducted through the [address] [employer] platform  She agrees to proceed     My office door was closed  No one else was in the room  She acknowledged consent and understanding of privacy and security of the video platform  The patient has agreed to participate and understands they can discontinue the visit at any time  Patient is aware this is a billable service       Subjective  Rebecca Dee is a 28 y o  female  20 2/7 weeks gestation T2DM on Metformin 1000 mg twice a day and recently started on Lantus 20 units at 9:30 PM following 22 initial appointment with Smith Thorpe   History of GDM, has 3 yo and 3 yo  Patient recently relocated from [place]  Eating 3 meals and 2 snacks a day; encouraged to always have combination of carbohydrates and protein  Very familiar with the GDM meal plan  Reporting continued nausea with some vomiting when she wakes in the morning  Also reports some food aversions  Tries to walk daily and has an indoor swimming pool within her development club house  Testing fasting and 2 hours from the beginning of each meal  Patient agreed to update [employer] glucose flow sheet today  Patient also reported she will complete active blood work for A1c this week  HPI     Past Medical History:   Diagnosis Date   • Asthma     no meds/inhaler  in remission   • Depression     bipolar  300 mg serroquel  80 mg prozac   • Gestational diabetes     both pregnancies   • Preeclampsia     right after delivery with first 2018   • Type 2 diabetes mellitus ([address] )        • Varicella     had vaccines       Past Surgical History:   Procedure Laterality Date   •  SECTION      2018   • LAPAROSCOPY      left salpingectomy        Current Outpatient Medications   Medication Sig Dispense Refill   • Aspirin Low Dose 81 MG chewable tablet Chew 162 mg daily     • Blood Glucose Monitoring Suppl (OneTouch Verio Flex System) w/Device KIT Dispense 1 kit per insurance formulary  1 kit 0   • Blood Glucose Monitoring Suppl (OneTouch Verio Reflect) w/Device KIT DX: E11 9, THREE TIMES A DAY     • FLUoxetine (PROzac) 40 MG capsule Take 80 mg by mouth daily     • Insulin Glargine Solostar (Lantus SoloStar) 100 UNIT/ML SOPN Inject 0 2 mL (20 Units total) under the skin daily at bedtime At 9:30 PM  To be titrated  T2DM and pregnancy  15 mL 0   • Insulin Pen Needle 31G X 5 MM MISC Inject under the skin daily at bedtime Use one a day or as directed   100 each 1   • Lancets (onetouch ultrasoft) lancets TESTING 3X DAILY DX E11 9     • metFORMIN (GLUCOPHAGE) 1000 MG tablet TAKE 1 TABLET BY MOUTH TWICE A DAY WITH MEALS 180 tablet 1   • ondansetron (Zofran ODT) 4 mg disintegrating tablet Take 1 tablet (4 mg total) by mouth every 6 (six) hours as needed for nausea or vomiting 20 tablet 0   • OneTouch Delica Lancets 12Q MISC Use 6 a day or as directed  T2DM and pregnancy  100 each 6   • OneTouch Verio test strip Test 6 times a day and as instructed  T2DM and pregnancy  150 each 6   • Prenatal MV-Min-Fe Fum-FA-DHA (PRENATAL 1 PO) Take by mouth     • QUEtiapine Fumarate (SEROQUEL PO) Take 300 mg by mouth       No current facility-administered medications for this visit  No Known Allergies    Review of Systems    Video Exam    There were no vitals filed for this visit  Physical Exam     I spent 60 minutes with patient today in which greater than 50% of the time was spent in counseling/coordination of care regarding type 2 diabetes currently pregnant in the second trimset         Thank you for referring your patient to [facility] Diabetes in Pregnancy Program      Rosibel Light is a  28 y o  female who presents today for Class 2  Patient is at 20w2d gestation, Estimated Date of Delivery: 4/23/23  Reviewed and updated the following from patients medical record: PMH, Problem List, Allergies, and Current Medications  Visit Diagnosis:  Pre-existing type 2 DM with hyperglycemia in pregnancy  Diagnosed after the delivery of her second child  Previous 2 pregnancies patient reported GDM  Second pregnancy patient was on insulin therapy using a vial and syringe  Patient reported the insulin pen is much easier to use          Additional Pregnancy Complications:  Obesity   Pregnancy close together and as above history of GDM with previous 2 pregnancies    Labs:    No results found for: [id number]    Lab Results   Component Value Date    GLUF 107 (H) 10/03/2022        10/3/22 A1c6 6%  Pending active A1c     Medications:  Metformin 1000 mg twice a day with breakfast and dinner meal   Lantus 20 units at 9:30 PM daily started on 22  Anthropometrics:  Ht Readings from Last 3 Encounters:   22 5' 6" (1 676 m)   22 5' 6" (1 676 m)   11/15/22 5' 6" (1 676 m)     Wt Readings from Last 3 Encounters:   22 107 kg (235 lb)   22 106 kg (234 lb)   11/15/22 106 kg (234 lb)     Pre-gravid weight: 109 kg (240 lb)  Pre-gravid BMI: 38 76  Weight Change: -2 268 kg (-5 lb)  Weight gain recommendations: BMI (> 30) 11-20 lbs  Comments: Patient reporting nausea with some vomiting continuing into the second trimester however improving  Several food aversions  Recent Ultra Sound Results:  Date:11/15/22  Fetal Growth:Normal  MELIDA: Normal  Next US date: 22 Detailed ultrasond    Blood Glucose Monitoring:  Reinforcement of blood glucose goals and reporting guidelines  Glucose Meter: OneTouch Verio Flex  Testing blood sugars: 6 times per day before and 2 hrs after the start of each meal   Method of reporting blood sugars:Glucose Flowsheet    Report blood sugar results weekly via:  Phone: [phone]   OR  My Chart (Message with image attachment, or Glucose Flowsheet)    Goal Blood Sugar Ranges:   Fastin-90 mg/dL  1 hour after the start of each meal: 140 mg/dL or less  2 hours after start of each meal: 120 mg/dL or less      BG Log:  Review of blood glucose log  Last update of flow sheet was on 22  Patient was unable to provide specifics on bg report  Patient agreed to update today  Patient reported from memory 22 FBG 95 and all 2 hr pp are <120 mg/dl  Patient may need an adjustment on Lantus dose  Patient is tolerating maximum dose of Metformin 1000 mg twice a day  Meal Plan:  Calories: 2400 calorie (CHO: 47-94-62-30-60-30) (PRO: 3-2-3/4-2-3/4-2)    Diet Type: Regular  Additional Nutrition concerns: Some protein food aversions  Can tolerate eggs,cheese, nuts, peanut butter, some thinly sliced beef   No ground beef, pork or fish  Diet review: Very familiar with GDM meal plan  Patient is following meal plan well  Diet Instruction:  The patient was instructed on the followin  Individualized meal plan  2  Importance of consistent carbohydrate intake via 3 meals and 3 snacks per day   3  Importance of protein as it relates to blood glucose control  4  Encouraged  patient to eat every 2 0-3 5 hours while awake  5  Encouraged patient to go no longer than 8-10 hours fasting overnight until first meal of the day  6  Provided suggested meal/snack options to increase nutrition and maintain consistent meal and snack intakes  Physical Activity:  Discussed benefits of physical activity to optimize blood glucose control, encouraged activity at patient is physically able  Always consult a physician prior to starting an exercise program  Recommend 20-30 minutes daily  Is patient physically active? Patient is trying to either walk or swim    Sick day Guidelines:   Patient advised that sickness will raise blood sugar and need to continue medication regimen as directed  If blood sugar is > 160 mg/dL twice in one day call doctor  Instructed on what to do when unable to consume normal meal plan  Hypoglycemia & Treatment Guidelines:  Reviewed what hypoglycemia is, signs and symptoms, and how to treat via the 15:15 rule  Breastfeeding Guidelines:  Continue GDM meal plan plus additional 350-500 calories daily  Stay hydrated by drinking 8-10 (8 oz ) fluids daily  Examples of protein and carbohydrate snacks provided  Dining Out & Travel Guidelines:  Patient advised to be prepared with extra diabetes supplies, medications, and snacks, as well as sticking to the same time schedule and portions eaten at home for meals and snacks  Maternal-Fetal Testing:    Ultrasounds- growth scans every 4 weeks     NST- twice weekly starting at 32nd week GA   MELIDA- weekly starting at 32 weeks GA    Patient Stated Goal: "I will eat 3 meals and 3 snacks each day, including protein at each"  Goal Assessment: On track    Diabetes Self Management Support Plan outside of ongoing care: Spouse/Family    Learner/s Present:Learners Present: Patient   Barriers to Learning/Change: No Barriers  Expected Compliance: good    Date to report blood sugars: today and is aware that an adjustment of insulin dose may be needed  Advised to report weekly for duration of her pregnancy  Follow up date: no follow up scheduled  Begin Time: 1;30 PM  End Time: 2:30 PM    It was a pleasure working with them today  Please feel free to call with any questions or concerns      Mikala Reynaga RD,LDN,CDE  Diabetes Educator  [facility]  Diabetes in Pregnancy Program  [facility], [address] [place] [address],[address]  [address]

## 2022-12-16 ENCOUNTER — ROUTINE PRENATAL (OUTPATIENT)
Dept: PERINATAL CARE | Facility: OTHER | Age: 32
End: 2022-12-16

## 2022-12-16 VITALS
WEIGHT: 232 LBS | HEART RATE: 69 BPM | HEIGHT: 66 IN | SYSTOLIC BLOOD PRESSURE: 123 MMHG | DIASTOLIC BLOOD PRESSURE: 82 MMHG | BODY MASS INDEX: 37.28 KG/M2

## 2022-12-16 DIAGNOSIS — O34.219 PREVIOUS CESAREAN DELIVERY, ANTEPARTUM: ICD-10-CM

## 2022-12-16 DIAGNOSIS — Z36.3 ENCOUNTER FOR ANTENATAL SCREENING FOR MALFORMATIONS: ICD-10-CM

## 2022-12-16 DIAGNOSIS — Z3A.20 20 WEEKS GESTATION OF PREGNANCY: ICD-10-CM

## 2022-12-16 DIAGNOSIS — O24.112 PRE-EXISTING TYPE 2 DIABETES MELLITUS DURING PREGNANCY IN SECOND TRIMESTER: Primary | ICD-10-CM

## 2022-12-16 DIAGNOSIS — Z36.86 ENCOUNTER FOR ANTENATAL SCREENING FOR CERVICAL LENGTH: ICD-10-CM

## 2022-12-16 DIAGNOSIS — O21.9 NAUSEA AND VOMITING DURING PREGNANCY: ICD-10-CM

## 2022-12-16 RX ORDER — ONDANSETRON 4 MG/1
4 TABLET, ORALLY DISINTEGRATING ORAL EVERY 8 HOURS PRN
Qty: 30 TABLET | Refills: 0 | Status: SHIPPED | OUTPATIENT
Start: 2022-12-16

## 2022-12-16 NOTE — PATIENT INSTRUCTIONS
Thank you for choosing us for your  care today  If you have any questions about your ultrasound or care, please do not hesitate to contact us or your primary obstetrician  Some general instructions for your pregnancy are:    Exercise: Aim for 22 minutes per day (150 minutes per week) of regular exercise  Walking is great! Nutrition: Choose healthy sources of calcium, iron, and protein  Learn about Preeclampsia: preeclampsia is a common, serious high blood pressure complication in pregnancy  A blood pressure of [id number] (systolic or top number) or 79ASWZ (diastolic or bottom number) is not normal and needs evaluation by your doctor  Aspirin is sometimes prescribed in early pregnancy to prevent preeclampsia in women with risk factors - ask your obstetrician if you should be on this medication  If you smoke, try to reduce how many cigarettes you smoke or try to quit completely  Do not vape  Other warning signs to watch out for in pregnancy or postpartum: chest pain, obstructed breathing or shortness of breath, seizures, thoughts of hurting yourself or your baby, bleeding, a painful or swollen leg, fever, or headache (see AWHONN POST-BIRTH Warning Signs campaign)  If these happen call 911  Itching is also not normal in pregnancy and if you experience this, especially over your hands and feet, potentially worse at night, notify your doctors

## 2022-12-17 NOTE — PROGRESS NOTES
[address]: Ms Parrish Barajas was seen today for anatomic survey and cervical length screening ultrasound  See ultrasound report under "OB Procedures" tab  The time spent on this established patient on the encounter date included 7 minutes previsit service time reviewing records and precharting, 25 minutes face-to-face service time counseling regarding results and coordinating care, and  15 minutes charting, totalling 47 minutes  Please don't hesitate to contact our office with any concerns or questions    Myranda Ding MD

## 2022-12-29 ENCOUNTER — TELEPHONE (OUTPATIENT)
Dept: OBGYN CLINIC | Facility: CLINIC | Age: 32
End: 2022-12-29

## 2022-12-29 NOTE — TELEPHONE ENCOUNTER
Pt having vomiting  Pt began vomiting last galina - but seems better this AM  Kept eggs down this AM  Is able to drink  No diarrhea  Pt also on insulin  I told pt , if she cannot eat and or drink - to call back   She seems better as of this AM

## 2022-12-30 ENCOUNTER — APPOINTMENT (OUTPATIENT)
Dept: LAB | Facility: HOSPITAL | Age: 32
End: 2022-12-30

## 2022-12-30 DIAGNOSIS — O99.210 OBESITY AFFECTING PREGNANCY, ANTEPARTUM: ICD-10-CM

## 2022-12-30 DIAGNOSIS — O24.112 PRE-EXISTING TYPE 2 DIABETES MELLITUS DURING PREGNANCY IN SECOND TRIMESTER: ICD-10-CM

## 2022-12-30 DIAGNOSIS — Z3A.19 19 WEEKS GESTATION OF PREGNANCY: ICD-10-CM

## 2022-12-31 LAB
EST. AVERAGE GLUCOSE BLD GHB EST-MCNC: 108 MG/DL
HBA1C MFR BLD: 5.4 %

## 2023-01-01 LAB
2ND TRIMESTER 4 SCREEN SERPL-IMP: NORMAL
AFP ADJ MOM SERPL: 1.69
AFP INTERP AMN-IMP: NORMAL
AFP INTERP SERPL-IMP: NORMAL
AFP INTERP SERPL-IMP: NORMAL
AFP SERPL-MCNC: 87.6 NG/ML
AGE AT DELIVERY: 33.3 YR
GA METHOD: NORMAL
GA: 22.7 WEEKS
IDDM PATIENT QL: YES
MULTIPLE PREGNANCY: NO
NEURAL TUBE DEFECT RISK FETUS: 499 %

## 2023-01-03 ENCOUNTER — TELEPHONE (OUTPATIENT)
Dept: PERINATAL CARE | Facility: OTHER | Age: 33
End: 2023-01-03

## 2023-01-03 ENCOUNTER — ROUTINE PRENATAL (OUTPATIENT)
Dept: OBGYN CLINIC | Facility: CLINIC | Age: 33
End: 2023-01-03

## 2023-01-03 VITALS
WEIGHT: 230 LBS | HEIGHT: 66 IN | DIASTOLIC BLOOD PRESSURE: 76 MMHG | BODY MASS INDEX: 36.96 KG/M2 | SYSTOLIC BLOOD PRESSURE: 114 MMHG

## 2023-01-03 DIAGNOSIS — O24.112 PRE-EXISTING TYPE 2 DIABETES MELLITUS DURING PREGNANCY IN SECOND TRIMESTER: ICD-10-CM

## 2023-01-03 DIAGNOSIS — Z34.82 PRENATAL CARE, SUBSEQUENT PREGNANCY, SECOND TRIMESTER: ICD-10-CM

## 2023-01-03 DIAGNOSIS — Z3A.23 23 WEEKS GESTATION OF PREGNANCY: Primary | ICD-10-CM

## 2023-01-03 LAB
SL AMB  POCT GLUCOSE, UA: NEGATIVE
SL AMB POCT URINE PROTEIN: NEGATIVE

## 2023-01-03 NOTE — PATIENT INSTRUCTIONS
Pregnancy at 23 to 26 Weeks   AMBULATORY CARE:   What changes are happening to your body: You are now close to or at the beginning of the third trimester  The third trimester starts at 24 weeks and ends with delivery  As your baby gets larger, you may develop certain symptoms  These may include pain in your back or down the sides of your abdomen  You may also have stretch marks on your abdomen, breasts, thighs, or buttocks  You may also have constipation  Seek care immediately if:   You develop a severe headache that does not go away  You have new or increased vision changes, such as blurred or spotted vision  You have new or increased swelling in your face or hands  You have vaginal spotting or bleeding  Your water broke or you feel warm water gushing or trickling from your vagina  Call your doctor or obstetrician if:   You have abdominal cramps, pressure, or tightening  You have a change in vaginal discharge  You have light bleeding  You have chills or a fever  You have vaginal itching, burning, or pain  You have yellow, green, white, or foul-smelling vaginal discharge  You have pain or burning when you urinate, less urine than usual, or pink or bloody urine  You have questions or concerns about your condition or care  How to care for yourself at this stage of your pregnancy:       Eat a variety of healthy foods  Healthy foods include fruits, vegetables, whole-grain breads, low-fat dairy foods, beans, lean meats, and fish  Drink liquids as directed  Ask how much liquid to drink each day and which liquids are best for you  Limit caffeine to less than 200 milligrams each day  Limit your intake of fish to 2 servings each week  Choose fish low in mercury such as canned light tuna, shrimp, salmon, cod, or tilapia  Do not  eat fish high in mercury such as swordfish, tilefish, benigno mackerel, and shark  Manage back pain    Do not stand for long periods of time or lift heavy items  Use good posture while you stand, squat, or bend  Wear low-heeled shoes with good support  Rest may also help to relieve back pain  Ask your healthcare provider about exercises you can do to strengthen your back muscles  Take prenatal vitamins as directed  Your need for certain vitamins and minerals, such as folic acid, increases during pregnancy  Prenatal vitamins provide some of the extra vitamins and minerals you need  Prenatal vitamins may also help to decrease the risk of certain birth defects  Talk to your healthcare provider about exercise  Moderate exercise can help you stay fit  Your healthcare provider will help you plan an exercise program that is safe for you during pregnancy  Do not smoke  Smoking increases your risk of a miscarriage and other health problems during your pregnancy  Smoking can cause your baby to be born too early or weigh less at birth  Ask your healthcare provider for information if you need help quitting  Do not drink alcohol  Alcohol passes from your body to your baby through the placenta  It can affect your baby's brain development and cause fetal alcohol syndrome (FAS)  FAS is a group of conditions that causes mental, behavior, and growth problems  Talk to your healthcare provider before you take any medicines  Many medicines may harm your baby if you take them when you are pregnant  Do not take any medicines, vitamins, herbs, or supplements without first talking to your healthcare provider  Never use illegal or street drugs (such as marijuana or cocaine) while you are pregnant  Safety tips during pregnancy:   Avoid hot tubs and saunas  Do not use a hot tub or sauna while you are pregnant, especially during your first trimester  Hot tubs and saunas may raise your baby's temperature and increase the risk of birth defects  Avoid toxoplasmosis  This is an infection caused by eating raw meat or being around infected cat feces   It can cause birth defects, miscarriages, and other problems  Wash your hands after you touch raw meat  Make sure any meat is well-cooked before you eat it  Avoid raw eggs and unpasteurized milk  Use gloves or ask someone else to clean your cat's litter box while you are pregnant  Changes that are happening with your baby:  By 26 weeks, your baby will weigh about 2 pounds  Your baby will be about 10 inches long from the top of the head to the rump (baby's bottom)  Your baby's movements are much stronger now  Your baby's eyes are almost completely formed and can partially open  Your baby also sleeps and wakes up  What you need to know about prenatal care: Your healthcare provider will check your blood pressure and weight  You may also need the following:  A urine test  may also be done to check for sugar and protein  These can be signs of gestational diabetes or infection  Protein in your urine may also be a sign of preeclampsia  Preeclampsia is a condition that can develop during week 20 or later of your pregnancy  It causes high blood pressure, and it can cause problems with your kidneys and other organs  A gestational diabetes screen  may be done  Your healthcare provider may order either a 1-step or 2-step oral glucose tolerance test (OGTT)  1-step OGTT:  Your blood sugar level will be tested after you have not eaten for 8 hours (fasting)  You will then be given a glucose drink  Your level will be tested again 1 hour and 2 hours after you finish the drink  2-step OGTT:  You do not have to fast for the first part of the test  You will have the glucose drink at any time of day  Your blood sugar level will be checked 1 hour later  If your blood sugar is higher than a certain level, another test will be ordered  You will fast and your blood sugar level will be tested  You will have the glucose drink  Your blood will be tested again 1 hour, 2 hours, and 3 hours after you finish the glucose drink      Fundal height is a measurement of your uterus to check your baby's growth  This number is usually the same as the number of weeks that you have been pregnant  Your baby's heart rate  will be checked  Follow up with your doctor or obstetrician as directed:  Write down your questions so you remember to ask them during your visits  © Copyright [employer] 2022 Information is for End User's use only and may not be sold, redistributed or otherwise used for commercial purposes  All illustrations and images included in CareNotes® are the copyrighted property of [employer]  or [facility] Reina Morton   The above information is an  only  It is not intended as medical advice for individual conditions or treatments  Talk to your doctor, nurse or pharmacist before following any medical regimen to see if it is safe and effective for you

## 2023-01-03 NOTE — TELEPHONE ENCOUNTER
Left message with low risk MSAFP result and call back number at the number provided on communication consent  Pt already viewed result as well

## 2023-01-03 NOTE — PROGRESS NOTES
Patient is here for prenatal ob visit today  No question's or concerns at this time  GA: 23w2d  Estimated Date of Delivery: 4/30/23  [id number]  Urine: Protein Negative / Glucose Negative  Denies loss of fluid, vaginal bleeding and contractions  Good fetal movement  28 week labs ordered today  Flu vaccine declined

## 2023-01-03 NOTE — PROGRESS NOTES
Problem   23 Weeks Gestation of Pregnancy    Blood Type: B positive  Antibody negative  Pap 10/10/2022  NILM  GC/CT -  negative  PN1 Labs- WNL H&H- 14   0  baseline preE labs wnl   28 Week Labs- ordered  COVID vaccine-   Flu vaccine - declined   Blue folder- Reviewed    Genetic screening- NIPT low risk  AFP- negative  Level 1- 10/28  Has VV with DP 10/21  Level 2-     Yellow folder-  TDAP -   Delivery consent-  Breast pump -   Pediatrician -    Perineal massage -  GBS swab -   IOL -       23 weeks gestation of pregnancy  Andres Skinner is a 28 y o  [id number] 23w2d here for routine prenatal care  Prepregnancy BMI 38 76 with a goal weight gain 5 kg (11 lb)-9 kg (19 lb)  TWG -4 536 kg (-10 lb)  Feels well  Denies LOF/CTX/VB  Good fetal movement  No concerns   labor precautions reviewed  Encouraged adequate hydration and nutrition  Pregnancy Essential guide and Baby and Me center web site recommended

## 2023-01-03 NOTE — ASSESSMENT & PLAN NOTE
Tierra Mejia is a 28 y o  [id number] 23w2d here for routine prenatal care  Prepregnancy BMI 38 76 with a goal weight gain 5 kg (11 lb)-9 kg (19 lb)  TWG -4 536 kg (-10 lb)  Feels well  Denies LOF/CTX/VB  Good fetal movement  No concerns   labor precautions reviewed  Encouraged adequate hydration and nutrition  Pregnancy Essential guide and Baby and Me center web site recommended

## 2023-01-06 ENCOUNTER — ROUTINE PRENATAL (OUTPATIENT)
Dept: PERINATAL CARE | Facility: OTHER | Age: 33
End: 2023-01-06

## 2023-01-06 VITALS
HEIGHT: 66 IN | DIASTOLIC BLOOD PRESSURE: 70 MMHG | HEART RATE: 95 BPM | BODY MASS INDEX: 37.12 KG/M2 | SYSTOLIC BLOOD PRESSURE: 104 MMHG

## 2023-01-06 DIAGNOSIS — O24.112 PRE-EXISTING TYPE 2 DIABETES MELLITUS DURING PREGNANCY IN SECOND TRIMESTER: Primary | ICD-10-CM

## 2023-01-06 DIAGNOSIS — Z3A.23 23 WEEKS GESTATION OF PREGNANCY: ICD-10-CM

## 2023-01-06 NOTE — PROGRESS NOTES
The patient was seen today for an ultrasound  Please see ultrasound report (located under Ob Procedures) for additional details  Thank you very much for allowing us to participate in the care of this very nice patient  Should you have any questions, please do not hesitate to contact me  Prabhakar Painter MD [address]  Attending Physician, [place]

## 2023-01-10 ENCOUNTER — TELEPHONE (OUTPATIENT)
Facility: HOSPITAL | Age: 33
End: 2023-01-10

## 2023-01-10 NOTE — TELEPHONE ENCOUNTER
Phone call to Cee Box regarding today's appointment and unable to leave a message due to mailbox is full  Pending return phone call

## 2023-01-13 ENCOUNTER — ULTRASOUND (OUTPATIENT)
Dept: PERINATAL CARE | Facility: OTHER | Age: 33
End: 2023-01-13

## 2023-01-13 VITALS
BODY MASS INDEX: 37.67 KG/M2 | HEART RATE: 102 BPM | SYSTOLIC BLOOD PRESSURE: 138 MMHG | DIASTOLIC BLOOD PRESSURE: 78 MMHG | HEIGHT: 66 IN | WEIGHT: 234.4 LBS

## 2023-01-13 DIAGNOSIS — Z36.2 ENCOUNTER FOR FOLLOW-UP ULTRASOUND OF FETAL ANATOMY: ICD-10-CM

## 2023-01-13 DIAGNOSIS — Z3A.24 24 WEEKS GESTATION OF PREGNANCY: ICD-10-CM

## 2023-01-13 DIAGNOSIS — Z36.89 ENCOUNTER FOR ULTRASOUND TO CHECK FETAL GROWTH: ICD-10-CM

## 2023-01-13 DIAGNOSIS — O24.112 PRE-EXISTING TYPE 2 DIABETES MELLITUS DURING PREGNANCY IN SECOND TRIMESTER: Primary | ICD-10-CM

## 2023-01-13 NOTE — PROGRESS NOTES
[address]: Ms Syeda Ott was seen today for fetal growth and followup missed anatomy ultrasound  See ultrasound report under "OB Procedures" tab  The time spent on this established patient on the encounter date included 4 minutes previsit service time reviewing records and precharting, 10 minutes face-to-face service time counseling regarding results and coordinating care, and  5 minutes charting, totalling 19 minutes    Please don't hesitate to contact our office with any concerns or questions   -Filipe Valle MD

## 2023-01-13 NOTE — PATIENT INSTRUCTIONS
Thank you for choosing us for your  care today  If you have any questions about your ultrasound or care, please do not hesitate to contact us or your primary obstetrician  Some general instructions for your pregnancy are:    Protect against coronavirus: get vaccinated - pregnant women are increased risk of severe COVID  Notify your primary care doctor if you have any symptoms  Exercise: Aim for 22 minutes per day (150 minutes per week) of regular exercise  Walking is great! Nutrition: aim for calcium-rich and iron-rich foods as well as healthy sources of protein  Learn about Preeclampsia: preeclampsia is a common, serious high blood pressure complication in pregnancy  A blood pressure of 311HLTM (systolic or top number) or 95JFVT (diastolic or bottom number) is not normal and needs evaluation by your doctor  Aspirin is sometimes prescribed in early pregnancy to prevent preeclampsia in women with risk factors - ask your obstetrician if you should be on this medication  If you smoke, try to reduce how many cigarettes you smoke or try to quit completely  Do not vape  Other warning signs to watch out for in pregnancy or postpartum: chest pain, obstructed breathing or shortness of breath, seizures, thoughts of hurting yourself or your baby, bleeding, a painful or swollen leg, fever, or headache (see AWHONN POST-BIRTH Warning Signs campaign)  If these happen call 911  Itching is also not normal in pregnancy and if you experience this, especially over your hands and feet, potentially worse at night, notify your doctors

## 2023-01-17 ENCOUNTER — DOCUMENTATION (OUTPATIENT)
Dept: PERINATAL CARE | Facility: CLINIC | Age: 33
End: 2023-01-17

## 2023-01-17 NOTE — PROGRESS NOTES
Date: 01/17/23  Marlo Rabia  1990  Estimated Date of Delivery: 4/30/23  25w2d  OB/GYN: Jasmin Ratliff  Pre-existing type 2 DM with hyperglycemia in pregnancy  Additional Pregnancy Complications: Obesity and history of gestational diabetes      Plan: 1/13/23 MD Juju Foley CRNP; Garcia Staple  Hello! I saw Lnidsey Castro for an [address] today and reminded her to submit her glucose logs which she will do shortly  She is still on glargine 28 U and states her sugars are well controlled w/ fastings in 80s and most 2 hour PP within range  She has occasionally symptomatic lows but these are in the low 80s and lowest has been 72  I encouraged her to snack between meals      Noted 12/21/22 mychart message from [place],CRNP: Lantus increased to 28 units and no mealtime insulin was added  Stop Metformin  Diet: : 2400 calorie (CHO: 56-66-25-30-60-30) (PRO: 3-2-3/4-2-3/4-2)  Gestational diabetes meal plan; 3 meals and 3 snacks  Some protein food aversions  Can tolerate eggs, cheese, nuts ,peanut butter, some thinly sliced beef  No ground beef, pork or fish  Testing blood sugars: 4 x per day (Fasting, 2 hour after start of each meal)  Meter: OneTouch Verio Flex  Activity: Walks 30 minutes daily, follow OB recommendations  Support System: Significant Other / family   Patient Goal: "I will eat 3 meals and 3 snacks each day, including protein at each"  Education:  10/26/22 and 11/29/22 [facility] evaluation appointments  Class 2 completed with **Abbi Emmanuel 12/6/22       Weight Change:    Pre-gravid weight: 109 kg (240 lb)  -2 54 kg (-5 lb 9 6 oz)  Weight gain recommendations: BMI (> 30) 11-20 lbs    Ultrasounds  1/13/23 Normal growth and MELIDA   EFW: 77 % AC: 81 % HC: 13 %  Next US scheduled for 2/17/23    Labs  No results found for: [id number]  Lab Results   Component Value Date    GLUF 107 (H) 10/03/2022     Lab Results   Component Value Date    HGBA1C 5 4 12/30/2022     Further fetal surveillance  Beginning at 28 weeks, NST / MELIDA twice a week, if indicated    Garcia Gomez RD,LDN,CDE  Diabetes Educator

## 2023-01-24 DIAGNOSIS — Z3A.19 19 WEEKS GESTATION OF PREGNANCY: ICD-10-CM

## 2023-01-24 DIAGNOSIS — O24.112 PRE-EXISTING TYPE 2 DIABETES MELLITUS DURING PREGNANCY IN SECOND TRIMESTER: Primary | ICD-10-CM

## 2023-01-24 DIAGNOSIS — O99.210 OBESITY AFFECTING PREGNANCY, ANTEPARTUM: ICD-10-CM

## 2023-01-24 RX ORDER — INSULIN GLARGINE 100 [IU]/ML
32 INJECTION, SOLUTION SUBCUTANEOUS
Qty: 15 ML | Refills: 0 | Status: SHIPPED | OUTPATIENT
Start: 2023-01-24

## 2023-02-01 NOTE — ASSESSMENT & PLAN NOTE
Beth Fan is a 35 y o   27w3d  TWG -2 54 kg (-5 lb 9 6 oz)  Feels well  Denies LOF/CTX/VB  No concerns  AFP completed  Vaccines discussed will get Tdap next visit    labor precautions reviewed  Encouraged adequate hydration and nutrition  Pregnancy Essential guide and Baby and Me center web site recommended

## 2023-02-01 NOTE — PATIENT INSTRUCTIONS
Pregnancy at 32 to 30 Weeks   AMBULATORY CARE:   What changes are happening to your body: You may notice new symptoms such as shortness of breath, heartburn, or swelling of your ankles and feet  You may also have trouble sleeping or contractions  Seek care immediately if:   You develop a severe headache that does not go away  You have new or increased vision changes, such as blurred or spotted vision  You have new or increased swelling in your face or hands  You have vaginal spotting or bleeding  Your water broke or you feel warm water gushing or trickling from your vagina  Call your doctor or obstetrician if:   You have more than 5 contractions in 1 hour  You notice any changes in your baby's movements  You have abdominal cramps, pressure, or tightening  You have a change in vaginal discharge  You have chills or a fever  You have vaginal itching, burning, or pain  You have yellow, green, white, or foul-smelling vaginal discharge  You have pain or burning when you urinate, less urine than usual, or pink or bloody urine  You have questions or concerns about your condition or care  How to care for yourself at this stage of your pregnancy:       Eat a variety of healthy foods  Healthy foods include fruits, vegetables, whole-grain breads, low-fat dairy foods, beans, lean meats, and fish  Drink liquids as directed  Ask how much liquid to drink each day and which liquids are best for you  Limit caffeine to less than 200 milligrams each day  Limit your intake of fish to 2 servings each week  Choose fish low in mercury such as canned light tuna, shrimp, salmon, cod, or tilapia  Do not  eat fish high in mercury such as swordfish, tilefish, benigno mackerel, and shark  Manage heartburn  by eating 4 or 5 small meals each day instead of large meals  Avoid spicy food  Manage swelling  by lying down and putting your feet up  Take prenatal vitamins as directed  Your need for certain vitamins and minerals, such as folic acid, increases during pregnancy  Prenatal vitamins provide some of the extra vitamins and minerals you need  Prenatal vitamins may also help to decrease the risk of certain birth defects  Talk to your healthcare provider about exercise  Moderate exercise can help you stay fit  Your healthcare provider will help you plan an exercise program that is safe for you during pregnancy  Do not smoke  Smoking increases your risk of a miscarriage and other health problems during your pregnancy  Smoking can cause your baby to be born too early or weigh less at birth  Ask your healthcare provider for information if you need help quitting  Do not drink alcohol  Alcohol passes from your body to your baby through the placenta  It can affect your baby's brain development and cause fetal alcohol syndrome (FAS)  FAS is a group of conditions that causes mental, behavior, and growth problems  Talk to your healthcare provider before you take any medicines  Many medicines may harm your baby if you take them when you are pregnant  Do not take any medicines, vitamins, herbs, or supplements without first talking to your healthcare provider  Never use illegal or street drugs (such as marijuana or cocaine) while you are pregnant  Safety tips during pregnancy:   Avoid hot tubs and saunas  Do not use a hot tub or sauna while you are pregnant, especially during your first trimester  Hot tubs and saunas may raise your baby's temperature and increase the risk of birth defects  Avoid toxoplasmosis  This is an infection caused by eating raw meat or being around infected cat feces  It can cause birth defects, miscarriages, and other problems  Wash your hands after you touch raw meat  Make sure any meat is well-cooked before you eat it  Avoid raw eggs and unpasteurized milk   Use gloves or ask someone else to clean your cat's litter box while you are pregnant  Changes that are happening with your baby:  By 30 weeks, your baby may weigh more than 3 pounds  Your baby may be about 11 inches long from the top of the head to the rump (baby's bottom)  Your baby's eyes open and close now  Your baby's kicks and movements are more forceful at this time  What you need to know about prenatal care: Your healthcare provider will check your blood pressure and weight  You may also need the following:  Blood tests  may be done to check for anemia or blood type  A urine test  may also be done to check for sugar and protein  These can be signs of gestational diabetes or infection  Protein in your urine may also be a sign of preeclampsia  Preeclampsia is a condition that can develop during week 20 or later of your pregnancy  It causes high blood pressure, and it can cause problems with your kidneys and other organs  A Tdap vaccine and flu vaccine  may be recommended by your healthcare provider  A gestational diabetes screen  may be done  Your healthcare provider may order either a 1-step or 2-step oral glucose tolerance test (OGTT)  1-step OGTT:  Your blood sugar level will be tested after you have not eaten for 8 hours (fasting)  You will then be given a glucose drink  Your level will be tested again 1 hour and 2 hours after you finish the drink  2-step OGTT:  You do not have to fast for the first part of the test  You will have the glucose drink at any time of day  Your blood sugar level will be checked 1 hour later  If your blood sugar is higher than a certain level, another test will be ordered  You will fast and your blood sugar level will be tested  You will have the glucose drink  Your blood will be tested again 1 hour, 2 hours, and 3 hours after you finish the glucose drink  Fundal height  is a measurement of your uterus to check your baby's growth  This number is usually the same as the number of weeks that you have been pregnant   Your healthcare provider may also check your baby's position  Your baby's heart rate  will be checked  Follow up with your doctor or obstetrician as directed:  Write down your questions so you remember to ask them during your visits  © Copyright [employer] 2022 Information is for End User's use only and may not be sold, redistributed or otherwise used for commercial purposes  All illustrations and images included in CareNotes® are the copyrighted property of A D [employer] M , Inc  or Shaina Morton   The above information is an  only  It is not intended as medical advice for individual conditions or treatments  Talk to your doctor, nurse or pharmacist before following any medical regimen to see if it is safe and effective for you  Kick Counts in Pregnancy   AMBULATORY CARE:   Kick counts  measure how much your baby is moving in your womb  A kick from your baby can be felt as a twist, turn, swish, roll, or jab  It is common to feel your baby kicking at 26 to 28 weeks of pregnancy  You may feel your baby kick as early as 20 weeks of pregnancy  You may want to start counting at 28 weeks  Contact your doctor immediately if:   You feel a change in the number of kicks or movements of your baby  You feel fewer than 10 kicks within 2 hours  You have questions or concerns about your baby's movements  Why measure kick counts:  Your baby's movement may provide information about your baby's health  He or she may move less, or not at all, if there are problems  Your baby may move less if he or she is not getting enough oxygen or nutrition from the placenta  Do not smoke while you are pregnant  Smoking decreases the amount of oxygen that gets to your baby  Talk to your healthcare provider if you need help to quit smoking  Tell your healthcare provider as soon as you feel a change in your baby's movements  When to measure kick counts:   Measure kick counts at the same time every day        Measure kick counts when your baby is awake and most active  Your baby may be most active in the evening  How to measure kick counts:  Check that your baby is awake before you measure kick counts  You can wake up your baby by lightly pushing on your belly, walking, or drinking something cold  Your healthcare provider may tell you different ways to measure kick counts  You may be told to do the following:  Use a chart or clock to keep track of the time you start and finish counting  Sit in a chair or lie on your left side  Place your hands on the largest part of your belly  Count until you reach 10 kicks  Write down how much time it takes to count 10 kicks  It may take 30 minutes to 2 hours to count 10 kicks  It should not take more than 2 hours to count 10 kicks  Follow up with your doctor as directed:  Write down your questions so you remember to ask them during your visits  © Copyright [employer] 2022 Information is for End User's use only and may not be sold, redistributed or otherwise used for commercial purposes  All illustrations and images included in CareNotes® are the copyrighted property of A D A M , Inc  or [employer]   The above information is an  only  It is not intended as medical advice for individual conditions or treatments  Talk to your doctor, nurse or pharmacist before following any medical regimen to see if it is safe and effective for you  Virgil Eaton Contractions   AMBULATORY CARE:   Denita Cordia contractions  are tightening and squeezing of the muscles of your uterus (womb) during pregnancy  The uterine muscles control the uterus  Vir[place] Eaton contractions stop on their own  They are not true labor contractions and do not cause your cervix (opening to your uterus) to dilate (open)    Common symptoms include the following:   Pain or discomfort in your groin or lower abdomen that comes and goes    Your contractions are short, and do not last longer each time they happen    Your contractions do not get closer together each time    Your contractions do not get stronger or more painful each time    Your contractions stop when you change your position or rest    Seek care immediately if:   You have bleeding from your vagina  You have fluid leaking from your vagina that does not stop  You feel a gush of fluid from your vagina  Your contractions happen every 5 minutes or sooner, and last for more than 60 seconds  Your contractions begin to feel stronger or more painful  You feel a change in your baby's movement, or you feel fewer than 6 to 10 movements in an hour  Call your doctor or obstetrician if:   You have a fever  You have questions or concerns about your condition or care  Treatment for [place] contractions  may include pain medicine to relieve discomfort or pain or sedatives to relax the muscles of your uterus  If you are dehydrated, he or she may give you fluids through an IV or tell you to drink liquids  Self-care:   Change your activity or your position  when you feel contractions begin  Walk if you have been lying or sitting  Lie down if you have been standing or walking  True labor will not stop by changing your position or activity  Take a warm bath  to relax your body  Drink more liquids  to prevent dehydration  Ask how much liquid to drink each day and which liquids are best for you  Practice your labor breathing  to decrease your discomfort  This may help you get ready for true labor  Take slow, deep breaths, or fast, short breaths  Ask your healthcare provider how to practice labor breathing  Follow up with your doctor or obstetrician as directed:  Write down your questions so you remember to ask them during your visits  © Copyright [address] 2022 Information is for End User's use only and may not be sold, redistributed or otherwise used for commercial purposes   All illustrations and images included in [employer] [address] are the copyrighted property of A D A M , Inc  or [facility] Reina Morton   The above information is an  only  It is not intended as medical advice for individual conditions or treatments  Talk to your doctor, nurse or pharmacist before following any medical regimen to see if it is safe and effective for you

## 2023-02-02 NOTE — PROGRESS NOTES
[id number] 27w4d  Pap 10/10/2022 Negative HPV Negative  GC/CT Negative / Negative   PN1 Labs: 10/3/2022  Blood Type: B   Positive :   MFM Level 1:11/15/2022  MFM Level 2:12/16/2022  AFP: 12/30/2022  28 Week Labs:10/3/2022  TDap:  Flu:  GBS:   Blue Folder: given   Yellow Folder: given at today's visit and reviewed with patient     Ped Referral : given   Breast pump:  L&D forms:  Delivery consent:     Patient reports positive fetal movements with no lost of fluids and no contractions at this time

## 2023-02-03 ENCOUNTER — ROUTINE PRENATAL (OUTPATIENT)
Dept: OBGYN CLINIC | Facility: CLINIC | Age: 33
End: 2023-02-03

## 2023-02-03 VITALS
DIASTOLIC BLOOD PRESSURE: 76 MMHG | WEIGHT: 237 LBS | HEIGHT: 66 IN | SYSTOLIC BLOOD PRESSURE: 128 MMHG | BODY MASS INDEX: 38.09 KG/M2

## 2023-02-03 DIAGNOSIS — Z34.82 PRENATAL CARE, SUBSEQUENT PREGNANCY, SECOND TRIMESTER: Primary | ICD-10-CM

## 2023-02-03 DIAGNOSIS — O21.9 NAUSEA AND VOMITING DURING PREGNANCY: ICD-10-CM

## 2023-02-03 PROBLEM — Z3A.27 27 WEEKS GESTATION OF PREGNANCY: Status: ACTIVE | Noted: 2022-09-23

## 2023-02-03 LAB
SL AMB  POCT GLUCOSE, UA: NEGATIVE
SL AMB POCT URINE PROTEIN: ABNORMAL

## 2023-02-03 RX ORDER — ONDANSETRON 4 MG/1
4 TABLET, ORALLY DISINTEGRATING ORAL EVERY 8 HOURS PRN
Qty: 30 TABLET | Refills: 0 | Status: SHIPPED | OUTPATIENT
Start: 2023-02-03

## 2023-02-03 NOTE — PROGRESS NOTES
Problem   Pre-Existing Type 2 Diabetes Mellitus During Pregnancy in Second Trimester    a1c at 11 wks= 6 6  On Metformin 1000 mg twice a day  Serial growth, APFS late third trimester     27 Weeks Gestation of Pregnancy    Blood Type: B positive  Antibody negative  Pap 10/10/2022  NILM  GC/CT -  negative  PN1 Labs- WNL H&H- 14   0  baseline preE labs wnl   28 Week Labs- ordered  COVID vaccine-   Flu vaccine - declined   Blue folder- Reviewed    Will be scheduled C section  Genetic screening- NIPT low risk  AFP- negative  Level 1- 10/28  Has VV with DP 10/21  Level 2-     Yellow folder- given   TDAP -   Delivery consent-  Breast pump -   Pediatrician -    Perineal massage -  GBS swab -   IOL -       27 weeks gestation of pregnancy  Francheska Call is a 35 y o   27w3d  TWG -2 54 kg (-5 lb 9 6 oz)  Feels well  Denies LOF/CTX/VB  No concerns  AFP completed  Vaccines discussed will get Tdap next visit    labor precautions reviewed  Encouraged adequate hydration and nutrition  Pregnancy Essential guide and Baby and Me center web site recommended                        Pre-existing type 2 diabetes mellitus during pregnancy in second trimester  Insulin dependent, reports she is doing well with control     Lab Results   Component Value Date    HGBA1C 5 4 2022

## 2023-02-03 NOTE — ASSESSMENT & PLAN NOTE
Insulin dependent, reports she is doing well with control     Lab Results   Component Value Date    HGBA1C 5 4 12/30/2022

## 2023-02-10 ENCOUNTER — TELEPHONE (OUTPATIENT)
Facility: HOSPITAL | Age: 33
End: 2023-02-10

## 2023-02-10 NOTE — TELEPHONE ENCOUNTER
Called patient and spoke with her  Reminded her to report blood sugars  Last reported blood sugar is from 1/23  She said she would upload today  She offers no questions or concerns at this time

## 2023-02-16 PROBLEM — Z3A.29 29 WEEKS GESTATION OF PREGNANCY: Status: ACTIVE | Noted: 2022-09-23

## 2023-02-17 ENCOUNTER — ULTRASOUND (OUTPATIENT)
Dept: PERINATAL CARE | Facility: OTHER | Age: 33
End: 2023-02-17

## 2023-02-17 VITALS
HEART RATE: 105 BPM | SYSTOLIC BLOOD PRESSURE: 136 MMHG | WEIGHT: 234.2 LBS | HEIGHT: 66 IN | BODY MASS INDEX: 37.64 KG/M2 | DIASTOLIC BLOOD PRESSURE: 80 MMHG

## 2023-02-17 DIAGNOSIS — Z36.2 ENCOUNTER FOR FOLLOW-UP ULTRASOUND OF FETAL ANATOMY: ICD-10-CM

## 2023-02-17 DIAGNOSIS — O09.299 HISTORY OF PRE-ECLAMPSIA IN PRIOR PREGNANCY, CURRENTLY PREGNANT: ICD-10-CM

## 2023-02-17 DIAGNOSIS — Z3A.29 29 WEEKS GESTATION OF PREGNANCY: Primary | ICD-10-CM

## 2023-02-17 DIAGNOSIS — O24.112 PRE-EXISTING TYPE 2 DIABETES MELLITUS DURING PREGNANCY IN SECOND TRIMESTER: ICD-10-CM

## 2023-02-17 DIAGNOSIS — Z36.89 ENCOUNTER FOR ULTRASOUND TO CHECK FETAL GROWTH: ICD-10-CM

## 2023-02-17 NOTE — LETTER
February 17, 2023     Gabriel Heller, [address] [facility] [address] [facility]  [address] [id number]    Patient: Bran Cotton   YOB: 1990   Date of Visit: 2/17/2023       Dear Dr Joshua Allen: Thank you for referring Bran Cotton to me for evaluation  Below are my notes for this consultation  Today she had reassuring fetal growth in the setting of well controlled T2DM on glargine 32U qhs    Her initial BP at our office was 142/100 and was 136/80 on repeat  Unfortunately today she shared with us that she found out her mom has metastatic lung cancer and is anxious and grieving this news  She feels this affected her blood pressure  I let her know if she has any additional mild range Bps we would check preeclampsia labs given her strong risk factors (T2DM, obesity, prior preeclampsia)  I recommend initiating home BP monitoring as she has a cuff at home and gave her parameters on when to call  She has her next visit with you on Monday 2/20    Please see my consultation/report in the ultrasound report under the "OB Procedures" tab in epic  If you have questions, please do not hesitate to call me  I look forward to following your patient along with you           Sincerely,        Macey Elliott MD        CC: No Recipients

## 2023-02-17 NOTE — PROGRESS NOTES
[address]: Ms Vazquez Reyes was seen today for fetal growth assessment ultrasound  See ultrasound report under "OB Procedures" tab  The time spent on this established patient on the encounter date included 5 minutes previsit service time reviewing records and precharting, 10 minutes face-to-face service time counseling regarding results and coordinating care, and  5 minutes charting, totalling 20 minutes    Please don't hesitate to contact our office with any concerns or questions   -Peri Leyden, MD

## 2023-02-17 NOTE — PATIENT INSTRUCTIONS
Please communicate your blood sugars at least weekly with the diabetic educators at the [address] [place] [address] Diabetes in Pregnancy program   The telephone number is [phone]  The e-mail address is [email or web address]  If you do not hear back from the program within 48 hours of sending your blood sugars, please call JANESSA Kirkland at [phone]  Thank you

## 2023-02-20 PROBLEM — Z3A.30 30 WEEKS GESTATION OF PREGNANCY: Status: ACTIVE | Noted: 2022-09-23

## 2023-02-21 ENCOUNTER — ROUTINE PRENATAL (OUTPATIENT)
Dept: OBGYN CLINIC | Facility: CLINIC | Age: 33
End: 2023-02-21

## 2023-02-21 VITALS
BODY MASS INDEX: 38.51 KG/M2 | DIASTOLIC BLOOD PRESSURE: 80 MMHG | WEIGHT: 239.6 LBS | SYSTOLIC BLOOD PRESSURE: 128 MMHG | HEIGHT: 66 IN

## 2023-02-21 DIAGNOSIS — Z34.83 PRENATAL CARE, SUBSEQUENT PREGNANCY, THIRD TRIMESTER: Primary | ICD-10-CM

## 2023-02-21 DIAGNOSIS — O21.9 NAUSEA AND VOMITING DURING PREGNANCY: ICD-10-CM

## 2023-02-21 DIAGNOSIS — K21.9 GASTROESOPHAGEAL REFLUX DISEASE WITHOUT ESOPHAGITIS: ICD-10-CM

## 2023-02-21 LAB
SL AMB  POCT GLUCOSE, UA: NEGATIVE
SL AMB POCT URINE PROTEIN: NEGATIVE

## 2023-02-21 RX ORDER — PANTOPRAZOLE SODIUM 20 MG/1
40 TABLET, DELAYED RELEASE ORAL DAILY
Qty: 30 TABLET | Refills: 0 | Status: SHIPPED | OUTPATIENT
Start: 2023-02-21

## 2023-02-21 RX ORDER — ONDANSETRON 4 MG/1
4 TABLET, ORALLY DISINTEGRATING ORAL EVERY 8 HOURS PRN
Qty: 30 TABLET | Refills: 0 | Status: SHIPPED | OUTPATIENT
Start: 2023-02-21

## 2023-02-21 NOTE — ASSESSMENT & PLAN NOTE
Marisol Ramirez is a 35 y o  [id number]  30w2d who presents for routine PNV  Her mother was just diagnosed with stage 4 lung cancer with mets to the brain, pt is very emotional, her mother lives with her  Currently her mother is in the hospital at [facility]   28 week labs reviewed:   TWG -0 181 kg (-6 4 oz)   Continues with vomiting in pregnancy  Vomits mostly bile, we discussed trying Protonix over the next 2 weeks  Instructed to take 40 mg orally in the morning 20 minutes before food or water   Good fetal movement  Denies contractions, cramping, leakage of fluid or vaginal bleeding  Tdap vaccine needs  Reviewed  labor precautions and FKCs     Advised to start Perineal massage at 34-36 weeks   Pregnancy Essential guide and Baby and Me web site recommended

## 2023-02-21 NOTE — PROGRESS NOTES
[id number] 30w2d  Pap 10/10/2022  Negative HPV Negative  GC/CT: Negative / Negative   PN1 Labs: 10/3/2023  Blood Type: B  Positive:   MFM Level 1:11/15/2022  MFM Level 2: 12/16/2022  AFP:  12/30/2022  28 Week Labs: 1/30/2023  TDap:  Flu:  GBS:   Blue Folder: given   Yellow Folder: given   Ped Referral : given   Breast pump:  L&D forms:  Delivery consent:     Patient reports positive fetal movements with no lost of fluids and no contractions at this time

## 2023-02-21 NOTE — ASSESSMENT & PLAN NOTE
Recently sent in values, stable values per patient   Lab Results   Component Value Date    HGBA1C 5 4 12/30/2022

## 2023-02-21 NOTE — PATIENT INSTRUCTIONS
Pregnancy at 31 to 34 Weeks   AMBULATORY CARE:   Changes happening with your body: You may continue to have symptoms such as shortness of breath, heartburn, contractions, or swelling of your ankles and feet  You may be gaining about 1 pound a week now  Seek care immediately if:   You develop a severe headache that does not go away  You have new or increased vision changes, such as blurred or spotted vision  You have new or increased swelling in your face or hands  You have vaginal spotting or bleeding  Your water broke or you feel warm water gushing or trickling from your vagina  Call your obstetrician if:   You have more than 5 contractions in 1 hour  You notice any changes in your baby's movements  You have abdominal cramps, pressure, or tightening  You have a change in vaginal discharge  You have chills or a fever  You have vaginal itching, burning, or pain  You have yellow, green, white, or foul-smelling vaginal discharge  You have pain or burning when you urinate, less urine than usual, or pink or bloody urine  You have questions or concerns about your condition or care  How to care for yourself at this stage of your pregnancy:       Eat a variety of healthy foods  Healthy foods include fruits, vegetables, whole-grain breads, low-fat dairy foods, beans, lean meats, and fish  Drink liquids as directed  Ask how much liquid to drink each day and which liquids are best for you  Limit caffeine to less than 200 milligrams each day  Limit your intake of fish to 2 servings each week  Choose fish low in mercury such as canned light tuna, shrimp, salmon, cod, or tilapia  Do not  eat fish high in mercury such as swordfish, tilefish, benigno mackerel, and shark  Manage heartburn  by eating 4 or 5 small meals each day instead of large meals  Avoid spicy food  Manage swelling  by lying down and putting your feet up  Take prenatal vitamins as directed    Your need for certain vitamins and minerals, such as folic acid, increases during pregnancy  Prenatal vitamins provide some of the extra vitamins and minerals you need  Prenatal vitamins may also help to decrease the risk of certain birth defects  Talk to your healthcare provider about exercise  Moderate exercise can help you stay fit  Your healthcare provider will help you plan an exercise program that is safe for you during pregnancy  Do not smoke  Smoking increases your risk of a miscarriage and other health problems during your pregnancy  Smoking can cause your baby to be born too early or weigh less at birth  Ask your healthcare provider for information if you need help quitting  Do not drink alcohol  Alcohol passes from your body to your baby through the placenta  It can affect your baby's brain development and cause fetal alcohol syndrome (FAS)  FAS is a group of conditions that causes mental, behavior, and growth problems  Talk to your healthcare provider before you take any medicines  Many medicines may harm your baby if you take them when you are pregnant  Do not take any medicines, vitamins, herbs, or supplements without first talking to your healthcare provider  Never use illegal or street drugs (such as marijuana or cocaine) while you are pregnant  Safety tips during pregnancy:   Avoid hot tubs and saunas  Do not use a hot tub or sauna while you are pregnant, especially during your first trimester  Hot tubs and saunas may raise your baby's temperature and increase the risk of birth defects  Avoid toxoplasmosis  This is an infection caused by eating raw meat or being around infected cat feces  It can cause birth defects, miscarriages, and other problems  Wash your hands after you touch raw meat  Make sure any meat is well-cooked before you eat it  Avoid raw eggs and unpasteurized milk  Use gloves or ask someone else to clean your cat's litter box while you are pregnant  Changes happening with your baby:  By 34 weeks, your baby may weigh more than 5 pounds  Your baby will be about 12 ½ inches long from the top of the head to the rump (baby's bottom)  Your baby is gaining about ½ pound a week  Your baby's eyes open and close now  Your baby's kicks and movements are more forceful at this time  What you need to know about prenatal care: Your healthcare provider will check your blood pressure and weight  You may also need the following:  A urine test  may also be done to check for sugar and protein  These can be signs of gestational diabetes or infection  Protein in your urine may also be a sign of preeclampsia  Preeclampsia is a condition that can develop during week 20 or later of your pregnancy  It causes high blood pressure, and it can cause problems with your kidneys and other organs  A gestational diabetes screen  may be done  Your healthcare provider may order either a 1-step or 2-step oral glucose tolerance test (OGTT)  1-step OGTT:  Your blood sugar level will be tested after you have not eaten for 8 hours (fasting)  You will then be given a glucose drink  Your level will be tested again 1 hour and 2 hours after you finish the drink  2-step OGTT:  You do not have to fast for the first part of the test  You will have the glucose drink at any time of day  Your blood sugar level will be checked 1 hour later  If your blood sugar is higher than a certain level, another test will be ordered  You will fast and your blood sugar level will be tested  You will have the glucose drink  Your blood will be tested again 1 hour, 2 hours, and 3 hours after you finish the glucose drink  A Tdap vaccine  may be recommended by your healthcare provider  Fundal height  is a measurement of your uterus to check your baby's growth  This number is usually the same as the number of weeks that you have been pregnant   Your healthcare provider may also check your baby's position  Your baby's heart rate  will be checked  Follow up with your obstetrician as directed:  Write down your questions so you remember to ask them during your visits  © Copyright [employer] 2022 Information is for End User's use only and may not be sold, redistributed or otherwise used for commercial purposes  The above information is an  only  It is not intended as medical advice for individual conditions or treatments  Talk to your doctor, nurse or pharmacist before following any medical regimen to see if it is safe and effective for you  Kick Counts in Pregnancy   AMBULATORY CARE:   Kick counts  measure how much your baby is moving in your womb  A kick from your baby can be felt as a twist, turn, swish, roll, or jab  It is common to feel your baby kicking at 26 to 28 weeks of pregnancy  You may feel your baby kick as early as 20 weeks of pregnancy  You may want to start counting at 28 weeks  Contact your doctor immediately if:   You feel a change in the number of kicks or movements of your baby  You feel fewer than 10 kicks within 2 hours  You have questions or concerns about your baby's movements  Why measure kick counts:  Your baby's movement may provide information about your baby's health  He or she may move less, or not at all, if there are problems  Your baby may move less if he or she is not getting enough oxygen or nutrition from the placenta  Do not smoke while you are pregnant  Smoking decreases the amount of oxygen that gets to your baby  Talk to your healthcare provider if you need help to quit smoking  Tell your healthcare provider as soon as you feel a change in your baby's movements  When to measure kick counts:   Measure kick counts at the same time every day  Measure kick counts when your baby is awake and most active  Your baby may be most active in the evening      How to measure kick counts:  Check that your baby is awake before you measure kick counts  You can wake up your baby by lightly pushing on your belly, walking, or drinking something cold  Your healthcare provider may tell you different ways to measure kick counts  You may be told to do the following:  Use a chart or clock to keep track of the time you start and finish counting  Sit in a chair or lie on your left side  Place your hands on the largest part of your belly  Count until you reach 10 kicks  Write down how much time it takes to count 10 kicks  It may take 30 minutes to 2 hours to count 10 kicks  It should not take more than 2 hours to count 10 kicks  Follow up with your doctor as directed:  Write down your questions so you remember to ask them during your visits  © Copyright [employer] 2022 Information is for End User's use only and may not be sold, redistributed or otherwise used for commercial purposes  The above information is an  only  It is not intended as medical advice for individual conditions or treatments  Talk to your doctor, nurse or pharmacist before following any medical regimen to see if it is safe and effective for you  Perineal Massage    Perineal massage is recommended starting after 34 weeks in order to reduce risks of perineal tearing during childbirth  You have been provided and instructional sheet in your yellow 28 week prenatal packet  Early Labor Signs   AMBULATORY CARE:   Early labor signs and symptoms  are the changes in your body that signal your baby is getting ready to be delivered  Early labor signs can happen weeks, days or hours before delivery  Call 911 for any of the following: You have heavy vaginal bleeding  You cannot get to the hospital before the baby starts to come out  Seek care immediately if:   You have regular, painful contractions that are less than 5 minutes apart and last 30 to 70 seconds each  You have a constant trickle or sudden gush of clear fluid from your vagina      You notice a sudden decrease in your baby's movement  Contact your obstetrician or healthcare provider if:   You have pain in your lower back or abdomen that does not get better when you change positions  You have bloody mucus or show  You have questions or concerns about your condition or care  Early labor signs and symptoms:   Lightening  occurs when your baby drops inside your pelvis  You may feel increased pressure in your pelvis  This may happen a few weeks to a few hours before your labor begins  Contractions  are cramps and tightening that occur in your uterus to help move the baby through your birth canal  Contractions occur regularly and more often each time  Each one lasts about 30 to 70 seconds, and gets stronger until you deliver your baby  Contractions do not go away with movement  The pain usually starts in your lower back and moves to your abdomen  Effacement  occurs when your cervix softens and thins, so it can easily open for the baby  You will not be able to feel effacement  Your healthcare provider will examine your cervix for effacement  Dilation  is widening of your cervix  Your healthcare provider will examine your cervix for dilation  Your cervix may start to dilate weeks before your baby is delivered  Your cervix will be fully opened and ready for delivery when it is dilated to 10 centimeters  Increased discharge  from your vagina may occur  It may be brown, pink, clear, or slightly bloody  This discharge may also be called bloody show  Bloody show is a mucus plug that forms and blocks your cervix during pregnancy  The discharge may mean that your cervix is opening up and getting ready for delivery  Rupture of membranes  is a sudden release of clear fluid from your vagina  Ruptured membranes means your water broke  Your healthcare provider may need to break your water if it does not happen on its own  False labor:  You may have false labor signs, which are also called Virgil Eaton contractions  False labor is common and may happen several weeks or days before your actual labor  The contractions are not regular, and do not get closer together  The pain is usually mild, does not worsen, and is felt only in front  Lavon Eaton contractions may happen later in the day, and stop after you change position, walk, or rest   Follow up with your obstetrician or healthcare provider as directed:  Write down your questions so you remember to ask them during your visit  © Copyright [employer] 2022 Information is for End User's use only and may not be sold, redistributed or otherwise used for commercial purposes  The above information is an  only  It is not intended as medical advice for individual conditions or treatments  Talk to your doctor, nurse or pharmacist before following any medical regimen to see if it is safe and effective for you

## 2023-02-21 NOTE — PROGRESS NOTES
Problem   Pre-Existing Type 2 Diabetes Mellitus During Pregnancy in Second Trimester    a1c at 11 wks= 6 6  On Metformin 1000 mg twice a day  Serial growth, APFS late third trimester     30 Weeks Gestation of Pregnancy    Blood Type: B positive  Antibody negative  Pap 10/10/2022  NILM  GC/CT -  negative  PN1 Labs- WNL H&H- 14   0  baseline preE labs wnl   28 Week Labs- ordered  COVID vaccine-   Flu vaccine - declined   Blue folder- Reviewed    Will be scheduled C section  Genetic screening- NIPT low risk  AFP- negative  Level 1- 10/28  Has VV with DP 10/21  Level 2-     Yellow folder- given   TDAP -   Delivery consent-  Breast pump -   Pediatrician -    Perineal massage -  GBS swab -   IOL -       30 weeks gestation of pregnancy  Ilir Goodman is a 35 y o  [id number]  30w2d who presents for routine PNV  Her mother was just diagnosed with stage 4 lung cancer with mets to the brain, pt is very emotional, her mother lives with her  Currently her mother is in the hospital at [facility]   28 week labs reviewed:   TWG -0 181 kg (-6 4 oz)   Continues with vomiting in pregnancy  Vomits mostly bile, we discussed trying Protonix over the next 2 weeks  Instructed to take 40 mg orally in the morning 20 minutes before food or water   Good fetal movement  Denies contractions, cramping, leakage of fluid or vaginal bleeding  Tdap vaccine needs  Reviewed  labor precautions and FKCs     Advised to start Perineal massage at 34-36 weeks   Pregnancy Essential guide and Baby and Me web site recommended       Pre-existing type 2 diabetes mellitus during pregnancy in second trimester  Recently sent in values, stable values per patient   Lab Results   Component Value Date    HGBA1C 5 4 2022

## 2023-02-27 ENCOUNTER — TELEPHONE (OUTPATIENT)
Dept: OBGYN CLINIC | Facility: CLINIC | Age: 33
End: 2023-02-27

## 2023-02-27 NOTE — TELEPHONE ENCOUNTER
Adalid Ramirez in L&D called and said this c section was denied  Her due date is 4/30  She needs another date  I cannot find out who ordered it - Adalid Ramirez asked me to check with you   Thank you

## 2023-03-01 ENCOUNTER — TELEPHONE (OUTPATIENT)
Dept: OBGYN CLINIC | Facility: CLINIC | Age: 33
End: 2023-03-01

## 2023-03-03 NOTE — PROGRESS NOTES
Pt is here for routine ob visit   No concerns at this time  Urine neg protein/ +4 glucose   No LOF,VB,Contractions  +FM   28wk labs not completed  Declined all vaccines   Repeat csection scheduled

## 2023-03-04 DIAGNOSIS — K21.9 GASTROESOPHAGEAL REFLUX DISEASE WITHOUT ESOPHAGITIS: ICD-10-CM

## 2023-03-06 ENCOUNTER — ROUTINE PRENATAL (OUTPATIENT)
Dept: PERINATAL CARE | Facility: OTHER | Age: 33
End: 2023-03-06

## 2023-03-06 ENCOUNTER — ROUTINE PRENATAL (OUTPATIENT)
Dept: OBGYN CLINIC | Age: 33
End: 2023-03-06

## 2023-03-06 VITALS
DIASTOLIC BLOOD PRESSURE: 84 MMHG | HEART RATE: 101 BPM | WEIGHT: 234.6 LBS | BODY MASS INDEX: 37.7 KG/M2 | HEIGHT: 66 IN | SYSTOLIC BLOOD PRESSURE: 144 MMHG

## 2023-03-06 VITALS
DIASTOLIC BLOOD PRESSURE: 86 MMHG | SYSTOLIC BLOOD PRESSURE: 138 MMHG | BODY MASS INDEX: 38.09 KG/M2 | HEIGHT: 66 IN | WEIGHT: 237 LBS

## 2023-03-06 DIAGNOSIS — O24.112 PRE-EXISTING TYPE 2 DIABETES MELLITUS DURING PREGNANCY IN SECOND TRIMESTER: ICD-10-CM

## 2023-03-06 DIAGNOSIS — Z34.83 ENCOUNTER FOR SUPERVISION OF OTHER NORMAL PREGNANCY, THIRD TRIMESTER: Primary | ICD-10-CM

## 2023-03-06 DIAGNOSIS — Z3A.32 32 WEEKS GESTATION OF PREGNANCY: Primary | ICD-10-CM

## 2023-03-06 DIAGNOSIS — Z3A.32 32 WEEKS GESTATION OF PREGNANCY: ICD-10-CM

## 2023-03-06 DIAGNOSIS — O09.299 HISTORY OF PRE-ECLAMPSIA IN PRIOR PREGNANCY, CURRENTLY PREGNANT: ICD-10-CM

## 2023-03-06 DIAGNOSIS — O13.3 GESTATIONAL HYPERTENSION WITHOUT SIGNIFICANT PROTEINURIA IN THIRD TRIMESTER: ICD-10-CM

## 2023-03-06 LAB
SL AMB  POCT GLUCOSE, UA: ABNORMAL
SL AMB POCT URINE PROTEIN: NEGATIVE

## 2023-03-06 NOTE — ASSESSMENT & PLAN NOTE
She noted fasting glucose has been slightly elevated - following with DP  On 32 units of Lantus HS     Lab Results   Component Value Date    HGBA1C 5 4 12/30/2022

## 2023-03-06 NOTE — PROGRESS NOTES
Non-Stress Testing:    Non-Stress test, equipment, procedure, and expected outcomes reviewed  Reviewed fetal kick counts and when to call OB  Roanna Schlatter Verified patient understanding of fetal kick counts with teach back method  Patient reports feeling daily fetal movements  Patient has no questions or concerns  NST reviewed by Dr Bharat Chinchilla

## 2023-03-06 NOTE — PATIENT INSTRUCTIONS
Pregnancy at 31 to 34 Weeks   AMBULATORY CARE:   Changes happening with your body: You may continue to have symptoms such as shortness of breath, heartburn, contractions, or swelling of your ankles and feet  You may be gaining about 1 pound a week now  Seek care immediately if:   You develop a severe headache that does not go away  You have new or increased vision changes, such as blurred or spotted vision  You have new or increased swelling in your face or hands  You have vaginal spotting or bleeding  Your water broke or you feel warm water gushing or trickling from your vagina  Call your obstetrician if:   You have more than 5 contractions in 1 hour  You notice any changes in your baby's movements  You have abdominal cramps, pressure, or tightening  You have a change in vaginal discharge  You have chills or a fever  You have vaginal itching, burning, or pain  You have yellow, green, white, or foul-smelling vaginal discharge  You have pain or burning when you urinate, less urine than usual, or pink or bloody urine  You have questions or concerns about your condition or care  How to care for yourself at this stage of your pregnancy:       Eat a variety of healthy foods  Healthy foods include fruits, vegetables, whole-grain breads, low-fat dairy foods, beans, lean meats, and fish  Drink liquids as directed  Ask how much liquid to drink each day and which liquids are best for you  Limit caffeine to less than 200 milligrams each day  Limit your intake of fish to 2 servings each week  Choose fish low in mercury such as canned light tuna, shrimp, salmon, cod, or tilapia  Do not  eat fish high in mercury such as swordfish, tilefish, benigno mackerel, and shark  Manage heartburn  by eating 4 or 5 small meals each day instead of large meals  Avoid spicy food  Manage swelling  by lying down and putting your feet up  Take prenatal vitamins as directed    Your need for certain vitamins and minerals, such as folic acid, increases during pregnancy  Prenatal vitamins provide some of the extra vitamins and minerals you need  Prenatal vitamins may also help to decrease the risk of certain birth defects  Talk to your healthcare provider about exercise  Moderate exercise can help you stay fit  Your healthcare provider will help you plan an exercise program that is safe for you during pregnancy  Do not smoke  Smoking increases your risk of a miscarriage and other health problems during your pregnancy  Smoking can cause your baby to be born too early or weigh less at birth  Ask your healthcare provider for information if you need help quitting  Do not drink alcohol  Alcohol passes from your body to your baby through the placenta  It can affect your baby's brain development and cause fetal alcohol syndrome (FAS)  FAS is a group of conditions that causes mental, behavior, and growth problems  Talk to your healthcare provider before you take any medicines  Many medicines may harm your baby if you take them when you are pregnant  Do not take any medicines, vitamins, herbs, or supplements without first talking to your healthcare provider  Never use illegal or street drugs (such as marijuana or cocaine) while you are pregnant  Safety tips during pregnancy:   Avoid hot tubs and saunas  Do not use a hot tub or sauna while you are pregnant, especially during your first trimester  Hot tubs and saunas may raise your baby's temperature and increase the risk of birth defects  Avoid toxoplasmosis  This is an infection caused by eating raw meat or being around infected cat feces  It can cause birth defects, miscarriages, and other problems  Wash your hands after you touch raw meat  Make sure any meat is well-cooked before you eat it  Avoid raw eggs and unpasteurized milk  Use gloves or ask someone else to clean your cat's litter box while you are pregnant  Changes happening with your baby:  By 34 weeks, your baby may weigh more than 5 pounds  Your baby will be about 12 ½ inches long from the top of the head to the rump (baby's bottom)  Your baby is gaining about ½ pound a week  Your baby's eyes open and close now  Your baby's kicks and movements are more forceful at this time  What you need to know about prenatal care: Your healthcare provider will check your blood pressure and weight  You may also need the following:  A urine test  may also be done to check for sugar and protein  These can be signs of gestational diabetes or infection  Protein in your urine may also be a sign of preeclampsia  Preeclampsia is a condition that can develop during week 20 or later of your pregnancy  It causes high blood pressure, and it can cause problems with your kidneys and other organs  A gestational diabetes screen  may be done  Your healthcare provider may order either a 1-step or 2-step oral glucose tolerance test (OGTT)  1-step OGTT:  Your blood sugar level will be tested after you have not eaten for 8 hours (fasting)  You will then be given a glucose drink  Your level will be tested again 1 hour and 2 hours after you finish the drink  2-step OGTT:  You do not have to fast for the first part of the test  You will have the glucose drink at any time of day  Your blood sugar level will be checked 1 hour later  If your blood sugar is higher than a certain level, another test will be ordered  You will fast and your blood sugar level will be tested  You will have the glucose drink  Your blood will be tested again 1 hour, 2 hours, and 3 hours after you finish the glucose drink  A Tdap vaccine  may be recommended by your healthcare provider  Fundal height  is a measurement of your uterus to check your baby's growth  This number is usually the same as the number of weeks that you have been pregnant   Your healthcare provider may also check your baby's position  Your baby's heart rate  will be checked  Follow up with your obstetrician as directed:  Write down your questions so you remember to ask them during your visits  © Copyright Daniel Sarmiento  Information is for End User's use only and may not be sold, redistributed or otherwise used for commercial purposes  The above information is an  only  It is not intended as medical advice for individual conditions or treatments  Talk to your doctor, nurse or pharmacist before following any medical regimen to see if it is safe and effective for you   Labor   AMBULATORY CARE:    (premature) labor  occurs when the uterus contracts and your cervix opens earlier than normal  The cervix is the opening of your uterus   labor happens after the 20th week of pregnancy but before the 37th week  You may have premature rupture of membranes (PROM)  PROM means your water broke before labor began  An early labor could cause you to have your baby before he or she is ready to be born  Common signs and symptoms include the following: You may not know that you are having  labor  It is common to have  contractions (tightening and relaxing of the uterus) and not notice them  The following are signs and symptoms that suggest a  labor:  Contractions that get stronger and closer together    Changes in vaginal discharge, such as more discharge or discharge that is watery or bloody     Low back pain     Pressure in the lower abdomen     Vaginal spotting or bleeding    Call your local emergency number (911 in the [address] [facility]) if:   You see or feel like there is something in your vagina  Call your doctor if:   You have bright red, painless vaginal bleeding  Your symptoms do not get better or they get worse  Your water broke or you feel warm water gushing or trickling from your vagina  You have contractions that get stronger and closer together for more than 1 hour       You notice a decrease in your baby's movement  You have abdominal cramps, pressure, or tightening  You have a change in vaginal discharge  You have a fever  You have burning when you urinate or you are urinating less than is normal for you  You have questions or concerns about your condition or care  How  labor is diagnosed: You may have one or more of the following tests to check for  labor:  A pelvic exam  is also called an internal or vaginal exam  During a pelvic exam, your healthcare provider will gently put a warmed speculum into your vagina  A speculum is a tool that opens your vagina  This lets your healthcare provider see if your cervix is opening  A vaginal ultrasound  uses sound waves to show pictures of your cervix and your baby inside your uterus  During this test, a small tube is placed into your vagina  This test will help your healthcare provider see if your cervix is opening  A fetal ultrasound  uses sound waves to show pictures of your baby inside your uterus  The movement, heart rate, and position of your baby can also be seen  A fetal fibronectin test  checks for a protein called fetal fibronectin in the cervix or vagina  Normally, there is no protein in cervical and vaginal secretions until the 20th week of pregnancy up to the end of pregnancy  Blood and urine tests  may be done to look for signs of infection  Treatment for  labor  may delay delivery  You may need any of the following:  Bed rest  may be recommended  You may need to lie on your left side, which improves circulation to the uterus and baby  Your healthcare provider will tell you when it is okay to get out of bed  Medicine  may be given to stop contractions if your baby is not ready to be born  You may also need certain medicines if your  labor cannot be stopped and your healthcare provider thinks you will have your baby early   These medicines help your baby's lungs, brain, and digestive organs mature  They also help decrease your baby's risk of being born with cerebral palsy  If you have PROM, fluid from your vagina or rectum will be checked for a strep infection  You may be given antibiotics to prevent a strep infection during delivery  Antibiotics may also be used to prevent labor from starting  You may also need steroids to decrease the risk for complications due to  labor  Self-care:   Rest  as much as possible  You may need to lie on your left side to improve circulation to the uterus and baby  You may be able to prevent  labor by resting and reducing your physical activity  Ask your healthcare provider about activities that are safe for you to do  Your healthcare provider or obstetrician may recommend that you avoid sexual intercourse  Ask your healthcare provider if exercise is safe  Drink liquids as directed  Ask how much liquid to drink each day and which liquids are best for you  Do not smoke  Your baby may not grow well and he or she may weigh less at birth if you smoke during pregnancy  Smoking also increases the risk that your baby will be born too early  Nicotine and other chemicals in cigarettes and cigars can cause lung damage  Ask your healthcare provider for information if you currently smoke and need help to quit  E-cigarettes or smokeless tobacco still contain nicotine  Talk to your healthcare provider before you use these products  Do not drink alcohol  Alcohol may harm your unborn baby and cause  labor  Maintain a healthy weight  A healthy weight may prevent  labor  Ask your healthcare provider how much weight you should gain during your pregnancy  Follow up with your doctor as directed:  Write down your questions so you remember to ask them during your visits    © Copyright Buster Garcia  Information is for End User's use only and may not be sold, redistributed or otherwise used for commercial purposes  The above information is an  only  It is not intended as medical advice for individual conditions or treatments  Talk to your doctor, nurse or pharmacist before following any medical regimen to see if it is safe and effective for you  Hypertension During Pregnancy   AMBULATORY CARE:   What you need to know about hypertension during pregnancy:  Hypertension is high blood pressure (BP)  Normal BP is 119/79 or lower  Hypertension during pregnancy is a BP of 140/90 or higher  Severe hypertension is 160/110 or higher  One or both numbers of these readings may be high  Hypertension may start before you become pregnant, or develop during pregnancy  Pregnancy can cause high BP, or it may develop because of other risk factors you had before you became pregnant  It is important to get screened and treated for an elevated BP or hypertension during pregnancy  This can prevent problems for you and your baby  Types of hypertension during pregnancy:   Chronic hypertension  is high BP that starts before pregnancy or develops within the first 20 weeks and does not go away after delivery  Gestational hypertension  means you develop new high BP after week 20 of your pregnancy  Gestational hypertension usually goes away after delivery, but it increases your risk for future chronic hypertension  Chronic hypertension with superimposed preeclampsia  is preeclampsia in a woman with a history of hypertension before pregnancy  It can also be preeclampsia that develops before week 20 of pregnancy  Preeclampsia  is high BP that usually develops after week 20 of pregnancy  It can also develop within 4 weeks of delivery  You may also have protein in your urine or damage to organs such as your kidneys or liver  Preeclampsia can lead to life-threatening conditions such as a stroke or eclampsia (seizures from severe high BP)       HELLP syndrome  is a life-threatening complication of high BP that may develop between week 20 of pregnancy and a few days after delivery  It causes destruction of red blood cells, liver problems, and blood clotting problems  Your risk for HELLP syndrome increases if you have a history of preeclampsia  Signs and symptoms  do not always happen with high BP  Hypertension may only be found during routine pregnancy checkups  You may have any of the following, depending on the kind of hypertension you have:  Headache or confusion    Spotted or blurred vision    Chest pain, trouble breathing, or a fast heartbeat    Dizziness or weakness    Abdominal pain, or pain on the right side of your upper abdomen, behind the ribs    Nausea and vomiting    Ringing or buzzing in your ears    Sudden weight gain or swelling in your face, arms, or legs    Severe fatigue that does not get better with rest    Call your local emergency number (911 in the [place]), or have someone else call if:   You have a seizure  You have chest pain  You faint or lose consciousness  You have trouble breathing  You have any of the following signs of a heart attack:      Squeezing, pressure, or pain in your chest    You may  also have any of the following:     Discomfort or pain in your back, neck, jaw, stomach, or arm    Shortness of breath    Nausea or vomiting    Lightheadedness or a sudden cold sweat    Seek care immediately if:   You have a severe headache or vision loss  You have weakness in an arm or leg  You urinate less than usual or stop urinating  You have fluid or blood leaking from your vagina that does not stop  You feel a gush of fluid from your vagina  You have severe abdominal pain with or without nausea and vomiting  You feel a change in your baby's movement, or you feel fewer than 6 to 10 movements in an hour  Call your doctor or obstetrician if:   You have new or increased swelling in your face or hands, or sudden weight gain      You have questions or concerns about your condition or care     How hypertension during pregnancy is diagnosed and treated: Your healthcare provider will ask about your symptoms  He or she will check your BP 2 times, at least 4 hours apart  You may need blood or urine tests to check for problems caused by hypertension  Treatment depends on how high your BP is and how many weeks you are into your pregnancy  Before 37 weeks, healthcare providers may want to monitor your condition if your BP is not severely high  An ultrasound may be done every 3 to 4 weeks to check your baby's growth  The amount of amniotic fluid may be measured every week  Your provider will tell you how often to come in for tests  Treatment may include any of the following:  Medicine  may be given to lower your BP or prevent seizures  The dose of current BP medicine you take may need to be changed  Daily low-dose aspirin may be recommended if you are at high risk for preeclampsia  Aspirin may help prevent preeclampsia or problems that it can cause  Your healthcare provider will tell you if you should take aspirin, and when to start  It is usually recommended from week 12 of pregnancy until delivery  Do not take aspirin unless directed by your healthcare provider  Ultrasounds  are used to check your baby's growth and make sure he or she is healthy  Delivery  may be recommended  Delivery may stop high BP or problems such as preeclampsia  Healthcare providers may deliver your baby right away if he or she is full-term (37 weeks or more)  You may need to deliver your baby early if you or the baby has life-threatening symptoms  Manage hypertension during pregnancy:  Your healthcare providers will tell you what BP is best for you during your pregnancy  They will help you create a plan to lower your BP safely and keep it at recommended levels  This is based on the kind of hypertension you have and how high your BP is   Sometimes it is difficult to diagnose a specific kind of hypertension, but you can still follow general guidelines:  Go to all scheduled appointments  Your healthcare providers will check your blood pressure and may order other tests  Rest as directed  Your healthcare provider may tell you to rest more often if you have mild symptoms of preeclampsia  Check your BP as directed if you have chronic hypertension  Sit and rest for 5 minutes before you take your BP  Extend your arm and support it on a flat surface  Your arm should be at the same level as your heart  Follow the directions that came with your BP monitor  Take your BP as often as directed  Keep a record of your BP readings and bring it to your follow-up visits  Do not drink alcohol or smoke  Alcohol, nicotine, and other chemicals in cigarettes and cigars can increase your BP  They can also harm your baby  Ask your healthcare provider for information if you currently drink alcohol or smoke and need help to quit  E-cigarettes or smokeless tobacco still contain nicotine  Talk to your healthcare provider before you use these products  Eat healthy foods  Healthy foods can help control your BP  Healthy foods include fruits, vegetables, whole-grain breads, low-fat dairy products, beans, lean meats, and fish  Ask if you need to be on a special diet  Exercise if directed  Exercise can help lower your BP  Ask your healthcare provider how much exercise you need and which exercise is right for you  Do kick counts as directed  You may need to keep track of how often your baby moves or kicks over a certain amount of time  Ask your obstetrician how to do kick counts and how often to do them  Check your weight each day  Weigh yourself every day before breakfast  Weight gain can be a sign of extra fluid in your body  Follow up with your doctor or obstetrician as directed:  Write down your questions so you remember to ask them during your visits     © Copyright [employer] 2022 Information is for End User's use only and may not be sold, redistributed or otherwise used for commercial purposes  The above information is an  only  It is not intended as medical advice for individual conditions or treatments  Talk to your doctor, nurse or pharmacist before following any medical regimen to see if it is safe and effective for you

## 2023-03-06 NOTE — PATIENT INSTRUCTIONS
Kick Counts in Pregnancy   AMBULATORY CARE:   Kick counts  measure how much your baby is moving in your womb  A kick from your baby can be felt as a twist, turn, swish, roll, or jab  It is common to feel your baby kicking at 26 to 28 weeks of pregnancy  You may feel your baby kick as early as 20 weeks of pregnancy  You may want to start counting at 28 weeks  Contact your doctor immediately if:   You feel a change in the number of kicks or movements of your baby  You feel fewer than 10 kicks within 2 hours  You have questions or concerns about your baby's movements  Why measure kick counts:  Your baby's movement may provide information about your baby's health  He or she may move less, or not at all, if there are problems  Your baby may move less if he or she is not getting enough oxygen or nutrition from the placenta  Do not smoke while you are pregnant  Smoking decreases the amount of oxygen that gets to your baby  Talk to your healthcare provider if you need help to quit smoking  Tell your healthcare provider as soon as you feel a change in your baby's movements  When to measure kick counts:   Measure kick counts at the same time every day  Measure kick counts when your baby is awake and most active  Your baby may be most active in the evening  How to measure kick counts:  Check that your baby is awake before you measure kick counts  You can wake up your baby by lightly pushing on your belly, walking, or drinking something cold  Your healthcare provider may tell you different ways to measure kick counts  You may be told to do the following:  Use a chart or clock to keep track of the time you start and finish counting  Sit in a chair or lie on your left side  Place your hands on the largest part of your belly  Count until you reach 10 kicks  Write down how much time it takes to count 10 kicks  It may take 30 minutes to 2 hours to count 10 kicks   It should not take more than 2 hours to count 10 kicks  Follow up with your doctor as directed:  Write down your questions so you remember to ask them during your visits  © Copyright Eleazar March 2022 Information is for End User's use only and may not be sold, redistributed or otherwise used for commercial purposes  The above information is an  only  It is not intended as medical advice for individual conditions or treatments  Talk to your doctor, nurse or pharmacist before following any medical regimen to see if it is safe and effective for you

## 2023-03-06 NOTE — ASSESSMENT & PLAN NOTE
Melva Garay is a 35 y o  Elda Karishma who presents for routine PNV  Prepregnancy BMI 38 76 with a goal weight gain 5 kg (11 lb)-9 kg (19 lb)  TWG -1 361 kg (-3 lb)    Denies OB complaints  Good fetal movement  Denies CTX/LOF/VB  Weekly NST and MELIDA with MFM  Reviewed  labor precautions and FKCs

## 2023-03-06 NOTE — PROGRESS NOTES
[address]: Ms Munguia Pump was seen today for NST (found under the pregnancy episode) which I reviewed the RN assessment and agree  Reactive NST with baseline 160  Elevated /84 with no repeat - primary OB made aware as Chuck Alvarez had an office visit following today's NST  Please don't hesitate to contact our office with any concerns or questions    -Yosi Mosqueda MD

## 2023-03-06 NOTE — PROGRESS NOTES
Problem   Gestational Hypertension Without Significant Proteinuria in Third Trimester   Pre-Existing Type 2 Diabetes Mellitus During Pregnancy in Second Trimester    a1c at 11 wks= 6 6  On Metformin 1000 mg twice a day  Serial growth, APFS late third trimester     32 Weeks Gestation of Pregnancy    Blood Type: B positive  Antibody negative  Pap 10/10/2022  NILM  GC/CT -  negative  PN1 Labs- WNL H&H- 14   0  baseline preE labs wnl   28 Week Labs- ordered  COVID vaccine-   Flu vaccine - declined   Blue folder- Reviewed    Will be scheduled C section  Genetic screening- NIPT low risk  AFP- negative  Level 1- 10/28  Has VV with DP 10/21  Level 2-     Yellow folder- given   TDAP - declined  Delivery consent-  Breast pump -   Pediatrician -    GBS swab -          Pre-existing type 2 diabetes mellitus during pregnancy in second trimester  She noted fasting glucose has been slightly elevated - following with DP  On 32 units of Lantus HS  Lab Results   Component Value Date    HGBA1C 5 4 2022       Gestational hypertension without significant proteinuria in third trimester  BP elevated today 138/86  History of preE in prior pregnancy  Asymptomatic  No proteinuria  preE labs ordered  Reviewed warning signs in pregnancy  She has been under increased stress lately with her mother recently diagnosed with brain cancer  Has home BP cuff  Recommended she check BP once daily  Call if elevated  32 weeks gestation of pregnancy  Sheryl Mcmahon is a 35 y o  [id number]  32w1d who presents for routine PNV  Prepregnancy BMI 38 76 with a goal weight gain 5 kg (11 lb)-9 kg (19 lb)  TWG -1 361 kg (-3 lb)    Denies OB complaints  Good fetal movement  Denies CTX/LOF/VB  Weekly NST and MELIDA with ROCHELLE  Reviewed  labor precautions and FKCs  independent None

## 2023-03-06 NOTE — ASSESSMENT & PLAN NOTE
BP elevated today 138/86  History of preE in prior pregnancy  Asymptomatic  No proteinuria  preE labs ordered  Reviewed warning signs in pregnancy  She has been under increased stress lately with her mother recently diagnosed with brain cancer  Has home BP cuff  Recommended she check BP once daily  Call if elevated

## 2023-03-06 NOTE — LETTER
NST sleeve cover sheet    Patient name: Raymond Olson  : 1990  MRN: [id number]    CANDELARIA: Estimated Date of Delivery: 23    Obstetrician: ____________SLOGA_______________    Reason(s) for testing:  _____________A2GDM_________________________      Testing frequency:    _x_ 2x/wk  ___ 1x/wk  ___ Dopplers  ___ BPP?       Last growth scan: __________________________________________

## 2023-03-07 RX ORDER — PANTOPRAZOLE SODIUM 20 MG/1
40 TABLET, DELAYED RELEASE ORAL DAILY
Qty: 180 TABLET | Refills: 1 | Status: SHIPPED | OUTPATIENT
Start: 2023-03-07

## 2023-03-10 ENCOUNTER — APPOINTMENT (OUTPATIENT)
Dept: LAB | Facility: HOSPITAL | Age: 33
End: 2023-03-10

## 2023-03-10 ENCOUNTER — ULTRASOUND (OUTPATIENT)
Dept: PERINATAL CARE | Facility: OTHER | Age: 33
End: 2023-03-10

## 2023-03-10 VITALS
HEART RATE: 102 BPM | WEIGHT: 243.2 LBS | BODY MASS INDEX: 39.08 KG/M2 | DIASTOLIC BLOOD PRESSURE: 60 MMHG | HEIGHT: 66 IN | SYSTOLIC BLOOD PRESSURE: 110 MMHG

## 2023-03-10 DIAGNOSIS — O13.3 GESTATIONAL HYPERTENSION WITHOUT SIGNIFICANT PROTEINURIA IN THIRD TRIMESTER: ICD-10-CM

## 2023-03-10 DIAGNOSIS — Z3A.32 32 WEEKS GESTATION OF PREGNANCY: ICD-10-CM

## 2023-03-10 DIAGNOSIS — O99.213 OBESITY AFFECTING PREGNANCY IN THIRD TRIMESTER: ICD-10-CM

## 2023-03-10 DIAGNOSIS — O24.113 PRE-EXISTING TYPE 2 DIABETES MELLITUS IN PREGNANCY IN THIRD TRIMESTER: Primary | ICD-10-CM

## 2023-03-10 LAB
ALBUMIN SERPL BCP-MCNC: 3.4 G/DL (ref 3.5–5)
ALP SERPL-CCNC: 83 U/L (ref 34–104)
ALT SERPL W P-5'-P-CCNC: 10 U/L (ref 7–52)
ANION GAP SERPL CALCULATED.3IONS-SCNC: 9 MMOL/L (ref 4–13)
AST SERPL W P-5'-P-CCNC: 12 U/L (ref 13–39)
BILIRUB SERPL-MCNC: 0.3 MG/DL (ref 0.2–1)
BUN SERPL-MCNC: 8 MG/DL (ref 5–25)
CALCIUM ALBUM COR SERPL-MCNC: 9.2 MG/DL (ref 8.3–10.1)
CALCIUM SERPL-MCNC: 8.7 MG/DL (ref 8.4–10.2)
CHLORIDE SERPL-SCNC: 102 MMOL/L (ref 96–108)
CO2 SERPL-SCNC: 21 MMOL/L (ref 21–32)
CREAT SERPL-MCNC: 0.45 MG/DL (ref 0.6–1.3)
CREAT UR-MCNC: 94.4 MG/DL
GFR SERPL CREATININE-BSD FRML MDRD: 132 ML/MIN/1.73SQ M
GLUCOSE SERPL-MCNC: 164 MG/DL (ref 65–140)
POTASSIUM SERPL-SCNC: 3.8 MMOL/L (ref 3.5–5.3)
PROT SERPL-MCNC: 6.5 G/DL (ref 6.4–8.4)
PROT UR-MCNC: 20 MG/DL
PROT/CREAT UR: 0.21 MG/G{CREAT} (ref 0–0.1)
SODIUM SERPL-SCNC: 132 MMOL/L (ref 135–147)
URATE SERPL-MCNC: 3.1 MG/DL (ref 2–7.5)

## 2023-03-10 NOTE — PROGRESS NOTES
NST is reactive  Glucose flowsheet recorded till 3/7/23 shows some fasting blood sugars above 90 and some 2-hour postprandials are also elevated  She was increased to Lantus 38 units on 3/7/23  Intermittent mildly elevated blood pressures are noted  Agree with diagnosis of gestational hypertension  Blood work today shows no evidence for preeclampsia including a PC ratio of 0 21, uric acid 3 1, normal creatinine 0 45 for pregnancy and normal liver function tests  Plts counts not ordered that I can see  Would continue twice weekly NST and weekly MELIDA till delivery  Delivery can be offered anytime after 37 weeks based on diagnosis of gestational hypertension  Recommend weekly preeclamptic labs ordered through her OB office      Jean Paul Castaneda MD

## 2023-03-10 NOTE — LETTER
Iván Chow  MRN: [id number] MRN: [id number] MRN: [id number]  : [birth year]  CANDELARIA/GA: CANDELARIA/GA: CANDELARIA/GA:  Date:  Date:  Date:     Iván Chow  MRN: [id number] MRN: [id number] MRN: [id number]  : [birth year]  CANDELARIA/GA: CANDELARIA/GA: CANDELARIA/GA:  Date:  Date:  Date:     Iván Chow  MRN: [id number] MRN: [id number] MRN: [id number]  : [birth year]  CANDELARIA/GA: CANDELARIA/GA: CANDELARIA/GA:  Date:  Date:  Date:     Iván Chow  MRN: [id number] MRN: [id number] MRN: [id number]  : [birth year] : 1990  CANDELARIA/GA: CANDELARIA/GA: CANDELARIA/GA:  Date:  Date:  Date:     Iván Chow  MRN: [id number] MRN: [id number] MRN: [id number]  : [birth year] : 1990  CANDELARIA/GA: CANDELARIA/GA: CANDELARIA/GA:  Date:  Date:  Date:     Iván Chow  MRN: [id number] MRN: [id number] MRN: [id number]  : 1990 : 1990 : 1990  CANDELARIA/GA: CANDELARIA/GA: CANDELARIA/GA:  Date:  Date:  Date:     Iván Chow  MRN: [id number] MRN: [id number] MRN: [id number]  : [birth year] : 1990  CANDELARIA/GA: CANDELARIA/GA: CANDELARIA/GA:  Date:  Date:  Date:

## 2023-03-10 NOTE — PROGRESS NOTES
Melissa verbalizes +FM,denies ALL:               Leaking of fluid   Contractions   Vaginal bleeding   Decreased fetal movement    Meredith Dang is performing daily kick counts  NST strip reviewed by Dr Farris  Reviewed signs and symptoms of high blood pressure, right upper epigastric pain, swelling, visual changes, and headaches  Pt verbalized understanding and verified with teach back method  Pt denies Headache      Epigastric pain      Visual changes  Reflexes:        Left  Antecubital: 2+  Right Antecubital: 2+     Left Patellar: 2+  Right Patellar: 2+      Clonus: Not present  /90 with repeat /60 Meredith Dang took her BP once a day at home and yesterday BP was 128/65, she did not take her BP today yet  Dr Hasmukh Villalobos recommended that she call her OB if her BP is equal to or more than 140/90 or if she became symptomatic

## 2023-03-10 NOTE — LETTER
March 10, 2023     [address] [address] [facility]  [address] [address]    Patient: Marlo Camarillo   YOB: 1990   Date of Visit: 3/10/2023       Dear Dr Nena Smith: Thank you for referring Marlo Camarillo to me for evaluation  Below are my notes for this consultation  If you have questions, please do not hesitate to call me  I look forward to following your patient along with you  Sincerely,        Aram Sabillon MD        CC: No Recipients  Aram Sabillon MD  3/10/2023  4:54 PM  Sign when Signing Visit  NST is reactive  Glucose flowsheet recorded till 3/7/23 shows some fasting blood sugars above 90 and some 2-hour postprandials are also elevated  She was increased to Lantus 38 units on 3/7/23  Intermittent mildly elevated blood pressures are noted  Agree with diagnosis of gestational hypertension  Blood work today shows no evidence for preeclampsia including a PC ratio of 0 21, uric acid 3 1, normal creatinine 0 45 for pregnancy and normal liver function tests  Plts counts not ordered that I can see  Would continue twice weekly NST and weekly MELIDA till delivery  Delivery can be offered anytime after 37 weeks based on diagnosis of gestational hypertension  Recommend weekly preeclamptic labs ordered through her OB office      Aram Sabillon MD

## 2023-03-11 ENCOUNTER — NURSE TRIAGE (OUTPATIENT)
Dept: OTHER | Facility: OTHER | Age: 33
End: 2023-03-11

## 2023-03-11 NOTE — TELEPHONE ENCOUNTER
Regarding: Constipation  ----- Message from Alysia Suárez RN sent at 3/11/2023  8:01 AM EST -----  "I am pregnant and constipated "

## 2023-03-11 NOTE — TELEPHONE ENCOUNTER
called 32w6d called in regards to constipation  states rectal pain and vaginal pressure from stool is 9/10  states it feels like i have stool size of baseball stuck  states bleeding from hemorrhoid's and passing some liquid stool  states 3 or 4 days of symptoms  straining and tried passing stool last night for about an hour with no relief  states no changes in diet or hydration  hasnt tried any laxatives  due to pain protocol advises ED  TC out to on call  Advised enema  If no relief go to ED  Called pt  Verbalized understanding  Reason for Disposition  • Severe rectal pain    Answer Assessment - Initial Assessment Questions  /1  STOOL PATTERN OR FREQUENCY: "How often do you pass bowel movements (BMs)?"  (Normal range: tid to q 3 days)  "When was the last BM passed?"        3 or 4 days without passing stool , feels like baseball stuck behind buttocks  usually was going everyday  Watery stool   2  STRAINING: "Do you have to strain to have a BM?"      Yes - bleeding red- states its hemorrhoids   3  RECTAL PAIN: "Does your rectum hurt when the stool comes out?" If Yes, ask: "Do you have hemorrhoids? How bad is the pain?"  (Scale 1-10; or mild, moderate, severe)      Pressure in rectal area and vaginal   4  STOOL COMPOSITION: "Are the stools hard?"       yes  5  BLOOD ON STOOLS: "Has there been any blood on the toilet tissue or on the surface of the BM?" If Yes, ask: "When was the last time?"     Has hemorrhoids and is bleeding   Dripping into toilet   6  CHRONIC CONSTIPATION: "Is this a new problem for you?"  If no, ask: How long have you had this problem?" (days, weeks, months)      First time in this pregnancy   7  CHANGES IN DIET OR HYDRATION: "Have there been any recent changes in your diet?" "How much fluids are you drinking consuming on a daily basis?"  "How much have you had to drink today?"     Staying hydrated   8   MEDICATIONS: "Have you been taking any new medications?" "Are you taking any narcotic pain medications?" (e g , Vicoden, Percocet, morphine, dilaudid)      Denies   9  LAXATIVES: "Have you been using any stool softeners, laxatives, or enemas?"  If yes, ask "What, how often, and when was the last time?"  10  ACTIVITY:  "How much walking do you do every day? on a daily basis?"  "Has your activity level decreased in the past week?"        Denies   11  OTHER SYMPTOMS: "Do you have any other symptoms?" (e g , abdominal pain, fever, vaginal bleeding, decreased fetal movement)       Vaginal pain   12   CANDELARIA: "What date are you expecting to deliver?"        32w6d    Protocols used: PREGNANCY - CONSTIPATION-ADULT-

## 2023-03-13 ENCOUNTER — ROUTINE PRENATAL (OUTPATIENT)
Dept: PERINATAL CARE | Facility: OTHER | Age: 33
End: 2023-03-13

## 2023-03-13 ENCOUNTER — TELEPHONE (OUTPATIENT)
Dept: OBGYN CLINIC | Facility: CLINIC | Age: 33
End: 2023-03-13

## 2023-03-13 VITALS
HEART RATE: 96 BPM | BODY MASS INDEX: 39.05 KG/M2 | SYSTOLIC BLOOD PRESSURE: 142 MMHG | DIASTOLIC BLOOD PRESSURE: 90 MMHG | WEIGHT: 243 LBS | HEIGHT: 66 IN

## 2023-03-13 DIAGNOSIS — Z3A.33 33 WEEKS GESTATION OF PREGNANCY: ICD-10-CM

## 2023-03-13 DIAGNOSIS — O99.213 OBESITY AFFECTING PREGNANCY IN THIRD TRIMESTER: ICD-10-CM

## 2023-03-13 DIAGNOSIS — O24.113 PRE-EXISTING TYPE 2 DIABETES MELLITUS DURING PREGNANCY IN THIRD TRIMESTER: Primary | ICD-10-CM

## 2023-03-13 DIAGNOSIS — O13.3 GESTATIONAL HYPERTENSION WITHOUT SIGNIFICANT PROTEINURIA IN THIRD TRIMESTER: ICD-10-CM

## 2023-03-13 NOTE — TELEPHONE ENCOUNTER
Tried to call patient voicemail is full  Is she using any OTC tx? Stool softeners, laxatives, prep H? Tucks pads?

## 2023-03-13 NOTE — TELEPHONE ENCOUNTER
Pt was just seen at [facility]/ 1wks and was told that she needs to discuss with us the hemorrhoids she's having  Pt states they are extremely painful  Please call to discuss

## 2023-03-13 NOTE — PROGRESS NOTES
Repeat Non-Stress Testing:    Patient verbalizes +FM  Pt denies ALL:               Leaking of fluid   Contractions   Vaginal bleeding   Decreased fetal movement    Patient is performing daily kick counts  Patient has no questions or concerns  NST strip reviewed by Dr Kyra Tracy  Nurse at bedside during NST to hold or adjust ultrasound monitor  FHR audible with fetal movement when not registering on nst strip  Dr Kyra Tracy aware and at bedside @ [phone]

## 2023-03-13 NOTE — PATIENT INSTRUCTIONS
Nonstress Test for Pregnancy   WHAT YOU NEED TO KNOW:   What do I need to know about a nonstress test?  A nonstress test measures your baby's heart rate and movements  Nonstress means that no stress will be placed on your baby during the test   How do I prepare for a nonstress test?  Your healthcare provider will talk to you about how to prepare for this test  Your provider may tell you to eat and drink plenty of liquids before your test  If you smoke, you may be asked not to smoke within 2 hours before the test  Your provider will also tell you which medicines to take or not take on the day of your test   What will happen during a nonstress test?  You may be asked to lie down or recline back for the test on a bed  One or 2 belts with sensors will be placed around your abdomen  Your baby's heart rate will be recorded with a machine  If your baby does not move, your baby may be asleep  Your healthcare provider may make a noise near your abdomen to try to wake your baby  The test usually takes about 20 minutes, but can take longer if your baby needs to be awakened  What do I need to know about the test results? Your baby will be expected to move at least 2 times for a certain amount of time  Your baby's heart rate will be expected to go up by a certain number of beats per minute during movement  If your baby does not move as expected, the test may need to be repeated or you may need other tests  CARE AGREEMENT:   You have the right to help plan your care  Learn about your health condition and how it may be treated  Discuss treatment options with your healthcare providers to decide what care you want to receive  You always have the right to refuse treatment  The above information is an  only  It is not intended as medical advice for individual conditions or treatments  Talk to your doctor, nurse or pharmacist before following any medical regimen to see if it is safe and effective for you    © Copyright [employer] 2022 Information is for End User's use only and may not be sold, redistributed or otherwise used for commercial purposes  Kick Counts in Pregnancy   AMBULATORY CARE:   Kick counts  measure how much your baby is moving in your womb  A kick from your baby can be felt as a twist, turn, swish, roll, or jab  It is common to feel your baby kicking at 26 to 28 weeks of pregnancy  You may feel your baby kick as early as 20 weeks of pregnancy  You may want to start counting at 28 weeks  Contact your doctor immediately if:   You feel a change in the number of kicks or movements of your baby  You feel fewer than 10 kicks within 2 hours  You have questions or concerns about your baby's movements  Why measure kick counts:  Your baby's movement may provide information about your baby's health  He or she may move less, or not at all, if there are problems  Your baby may move less if he or she is not getting enough oxygen or nutrition from the placenta  Do not smoke while you are pregnant  Smoking decreases the amount of oxygen that gets to your baby  Talk to your healthcare provider if you need help to quit smoking  Tell your healthcare provider as soon as you feel a change in your baby's movements  When to measure kick counts:   Measure kick counts at the same time every day  Measure kick counts when your baby is awake and most active  Your baby may be most active in the evening  How to measure kick counts:  Check that your baby is awake before you measure kick counts  You can wake up your baby by lightly pushing on your belly, walking, or drinking something cold  Your healthcare provider may tell you different ways to measure kick counts  You may be told to do the following:  Use a chart or clock to keep track of the time you start and finish counting  Sit in a chair or lie on your left side  Place your hands on the largest part of your belly  Count until you reach 10 kicks  Write down how much time it takes to count 10 kicks  It may take 30 minutes to 2 hours to count 10 kicks  It should not take more than 2 hours to count 10 kicks  Follow up with your doctor as directed:  Write down your questions so you remember to ask them during your visits  © Copyright [employer] 2022 Information is for End User's use only and may not be sold, redistributed or otherwise used for commercial purposes  The above information is an  only  It is not intended as medical advice for individual conditions or treatments  Talk to your doctor, nurse or pharmacist before following any medical regimen to see if it is safe and effective for you

## 2023-03-17 ENCOUNTER — ULTRASOUND (OUTPATIENT)
Dept: PERINATAL CARE | Facility: OTHER | Age: 33
End: 2023-03-17

## 2023-03-17 VITALS
BODY MASS INDEX: 38.67 KG/M2 | WEIGHT: 240.6 LBS | SYSTOLIC BLOOD PRESSURE: 138 MMHG | HEART RATE: 114 BPM | HEIGHT: 66 IN | DIASTOLIC BLOOD PRESSURE: 88 MMHG

## 2023-03-17 DIAGNOSIS — O24.113 PRE-EXISTING TYPE 2 DIABETES MELLITUS DURING PREGNANCY IN THIRD TRIMESTER: ICD-10-CM

## 2023-03-17 DIAGNOSIS — O13.3 GESTATIONAL HYPERTENSION WITHOUT SIGNIFICANT PROTEINURIA IN THIRD TRIMESTER: ICD-10-CM

## 2023-03-17 DIAGNOSIS — Z3A.33 33 WEEKS GESTATION OF PREGNANCY: Primary | ICD-10-CM

## 2023-03-17 DIAGNOSIS — Z3A.32 32 WEEKS GESTATION OF PREGNANCY: ICD-10-CM

## 2023-03-17 NOTE — LETTER
To whom it may concern, please allow the patient to receive routine dental care during her pregnancy  Other than avoiding epinephrine, routine dental care and dental surgeries can be undertaken during pregnancy  Thank you,    Prabhakar Ramirez MD  Attending Physician, [place]

## 2023-03-17 NOTE — PROGRESS NOTES
Repeat Non-Stress Testing:    Patient verbalizes +FM  Pt denies ALL:               Leaking of fluid   Contractions   Vaginal bleeding   Decreased fetal movement    Patient is performing daily kick counts  Patient has no questions or concerns  NST strip reviewed by Dr Bustillo Salvage

## 2023-03-17 NOTE — PATIENT INSTRUCTIONS
Kick Counts in Pregnancy   AMBULATORY CARE:   Kick counts  measure how much your baby is moving in your womb  A kick from your baby can be felt as a twist, turn, swish, roll, or jab  It is common to feel your baby kicking at 26 to 28 weeks of pregnancy  You may feel your baby kick as early as 20 weeks of pregnancy  You may want to start counting at 28 weeks  Contact your doctor immediately if:   You feel a change in the number of kicks or movements of your baby  You feel fewer than 10 kicks within 2 hours  You have questions or concerns about your baby's movements  Why measure kick counts:  Your baby's movement may provide information about your baby's health  He or she may move less, or not at all, if there are problems  Your baby may move less if he or she is not getting enough oxygen or nutrition from the placenta  Do not smoke while you are pregnant  Smoking decreases the amount of oxygen that gets to your baby  Talk to your healthcare provider if you need help to quit smoking  Tell your healthcare provider as soon as you feel a change in your baby's movements  When to measure kick counts:   Measure kick counts at the same time every day  Measure kick counts when your baby is awake and most active  Your baby may be most active in the evening  How to measure kick counts:  Check that your baby is awake before you measure kick counts  You can wake up your baby by lightly pushing on your belly, walking, or drinking something cold  Your healthcare provider may tell you different ways to measure kick counts  You may be told to do the following:  Use a chart or clock to keep track of the time you start and finish counting  Sit in a chair or lie on your left side  Place your hands on the largest part of your belly  Count until you reach 10 kicks  Write down how much time it takes to count 10 kicks  It may take 30 minutes to 2 hours to count 10 kicks   It should not take more than 2 hours to count 10 kicks  Follow up with your doctor as directed:  Write down your questions so you remember to ask them during your visits  © Copyright [employer] 2022 Information is for End User's use only and may not be sold, redistributed or otherwise used for commercial purposes  The above information is an  only  It is not intended as medical advice for individual conditions or treatments  Talk to your doctor, nurse or pharmacist before following any medical regimen to see if it is safe and effective for you

## 2023-03-20 ENCOUNTER — ROUTINE PRENATAL (OUTPATIENT)
Dept: OBGYN CLINIC | Age: 33
End: 2023-03-20

## 2023-03-20 VITALS
WEIGHT: 238.8 LBS | DIASTOLIC BLOOD PRESSURE: 72 MMHG | SYSTOLIC BLOOD PRESSURE: 106 MMHG | HEIGHT: 66 IN | BODY MASS INDEX: 38.38 KG/M2

## 2023-03-20 DIAGNOSIS — O24.113 PRE-EXISTING TYPE 2 DIABETES MELLITUS DURING PREGNANCY IN THIRD TRIMESTER: ICD-10-CM

## 2023-03-20 DIAGNOSIS — O13.3 GESTATIONAL HYPERTENSION WITHOUT SIGNIFICANT PROTEINURIA IN THIRD TRIMESTER: ICD-10-CM

## 2023-03-20 DIAGNOSIS — Z3A.34 34 WEEKS GESTATION OF PREGNANCY: Primary | ICD-10-CM

## 2023-03-20 LAB
DME PARACHUTE DELIVERY DATE REQUESTED: NORMAL
DME PARACHUTE ITEM DESCRIPTION: NORMAL
DME PARACHUTE ORDER STATUS: NORMAL
DME PARACHUTE SUPPLIER NAME: NORMAL
DME PARACHUTE SUPPLIER PHONE: NORMAL
SL AMB  POCT GLUCOSE, UA: 1
SL AMB POCT URINE PROTEIN: ABNORMAL

## 2023-03-20 NOTE — ASSESSMENT & PLAN NOTE
BP normotensive today and asymptomatic  Patient reports home BP has been normotensive  Plan for 37 week delivery via rCS and weekly preE labs  Continue ASA til 36 weeks

## 2023-03-20 NOTE — PATIENT INSTRUCTIONS
Pregnancy at 28 to 38 Weeks   AMBULATORY CARE:   Changes happening with your body: You are considered full term at the beginning of 37 weeks  Your breathing may be easier if your baby has moved down into a head-down position  You may need to urinate more often because the baby may be pressing on your bladder  You may also feel more discomfort and get tired easily  Seek care immediately if:   You develop a severe headache that does not go away  You have new or increased vision changes, such as blurred or spotted vision  You have new or increased swelling in your face or hands  You have vaginal spotting or bleeding  Your water broke or you feel warm water gushing or trickling from your vagina  Call your obstetrician if:   You have more than 5 contractions in 1 hour  You notice any changes in your baby's movements  You have abdominal cramps, pressure, or tightening  You have a change in vaginal discharge  You have chills or a fever  You have vaginal itching, burning, or pain  You have yellow, green, white, or foul-smelling vaginal discharge  You have pain or burning when you urinate, less urine than usual, or pink or bloody urine  You have questions or concerns about your condition or care  How to care for yourself at this stage of your pregnancy:       Eat a variety of healthy foods  Healthy foods include fruits, vegetables, whole-grain breads, low-fat dairy foods, beans, lean meats, and fish  Drink liquids as directed  Ask how much liquid to drink each day and which liquids are best for you  Limit caffeine to less than 200 milligrams each day  Limit your intake of fish to 2 servings each week  Choose fish low in mercury such as canned light tuna, shrimp, salmon, cod, or tilapia  Do not  eat fish high in mercury such as swordfish, tilefish, benigno mackerel, and shark  Take prenatal vitamins as directed    Your need for certain vitamins and minerals, such as folic acid, increases during pregnancy  Prenatal vitamins provide some of the extra vitamins and minerals you need  Prenatal vitamins may also help to decrease the risk of certain birth defects  Rest as needed  Put your feet up if you have swelling in your ankles and feet  Talk to your healthcare provider about exercise  Moderate exercise can help you stay fit  Your healthcare provider will help you plan an exercise program that is safe for you during pregnancy  Do not smoke  Smoking increases your risk of a miscarriage and other health problems during your pregnancy  Smoking can cause your baby to be born early or weigh less at birth  Ask your healthcare provider for information if you need help quitting  Do not drink alcohol  Alcohol passes from your body to your baby through the placenta  It can affect your baby's brain development and cause fetal alcohol syndrome (FAS)  FAS is a group of conditions that causes mental, behavior, and growth problems  Talk to your healthcare provider before you take any medicines  Many medicines may harm your baby if you take them when you are pregnant  Do not take any medicines, vitamins, herbs, or supplements without first talking to your healthcare provider  Never use illegal or street drugs (such as marijuana or cocaine) while you are pregnant  Safety tips during pregnancy:   Avoid hot tubs and saunas  Do not use a hot tub or sauna while you are pregnant, especially during your first trimester  Hot tubs and saunas may raise your baby's temperature and increase the risk of birth defects  Avoid toxoplasmosis  This is an infection caused by eating raw meat or being around infected cat feces  It can cause birth defects, miscarriages, and other problems  Wash your hands after you touch raw meat  Make sure any meat is well-cooked before you eat it  Avoid raw eggs and unpasteurized milk   Use gloves or ask someone else to clean your cat's litter box while you are pregnant  Ask your healthcare provider about travel  The most comfortable time to travel is during the second trimester  Ask your provider if you can travel after 36 weeks  You may not be able to travel in an airplane after 36 weeks  He or she may also recommend you avoid long road trips  Changes happening with your baby:  By 38 weeks, your baby may weigh between 6 and 9 pounds  Your baby may be about 14 inches long from the top of the head to the rump (baby's bottom)  Your baby hears well enough to know your voice  As your baby gets larger, you may feel fewer kicks and more stretching and rolling  Your baby may move into a head-down position  Your baby will also rest lower in your abdomen  What you need to know about prenatal care: Your healthcare provider will check your blood pressure and weight  You may also need the following:  A urine test  may also be done to check for sugar and protein  These can be signs of gestational diabetes or infection  Protein in your urine may also be a sign of preeclampsia  Preeclampsia is a condition that can develop during week 20 or later of your pregnancy  It causes high blood pressure, and it can cause problems with your kidneys and other organs  A gestational diabetes screen  may be done  Your healthcare provider may order either a 1-step or 2-step oral glucose tolerance test (OGTT)  1-step OGTT:  Your blood sugar level will be tested after you have not eaten for 8 hours (fasting)  You will then be given a glucose drink  Your level will be tested again 1 hour and 2 hours after you finish the drink  2-step OGTT:  You do not have to fast for the first part of the test  You will have the glucose drink at any time of day  Your blood sugar level will be checked 1 hour later  If your blood sugar is higher than a certain level, another test will be ordered  You will fast and your blood sugar level will be tested  You will have the glucose drink  Your blood will be tested again 1 hour, 2 hours, and 3 hours after you finish the glucose drink  A blood test  may be done to check for anemia (low iron level)  A Tdap vaccine  may be recommended by your healthcare provider  A group B strep test  is a test that is done to check for group B strep infection  Group B strep is a type of bacteria that may be found in the vagina or rectum  It can be passed to your baby during delivery if you have it  Your healthcare provider will take swab your vagina or rectum and send the sample to the lab for tests  Fundal height  is a measurement of your uterus to check your baby's growth  This number is usually the same as the number of weeks that you have been pregnant  Your healthcare provider may also check your baby's position  Your baby's heart rate  will be checked  Follow up with your obstetrician as directed:  Write down your questions so you remember to ask them during your visits  © [facility] Jeanne Fink 2022 Information is for End User's use only and may not be sold, redistributed or otherwise used for commercial purposes  The above information is an  only  It is not intended as medical advice for individual conditions or treatments  Talk to your doctor, nurse or pharmacist before following any medical regimen to see if it is safe and effective for you  Hypertension During Pregnancy   AMBULATORY CARE:   What you need to know about hypertension during pregnancy:  Hypertension is high blood pressure (BP)  Normal BP is 119/79 or lower  Hypertension during pregnancy is a BP of 140/90 or higher  Severe hypertension is 160/110 or higher  One or both numbers of these readings may be high  Hypertension may start before you become pregnant, or develop during pregnancy  Pregnancy can cause high BP, or it may develop because of other risk factors you had before you became pregnant   It is important to get screened and treated for an elevated BP or hypertension during pregnancy  This can prevent problems for you and your baby  Types of hypertension during pregnancy:   Chronic hypertension  is high BP that starts before pregnancy or develops within the first 20 weeks and does not go away after delivery  Gestational hypertension  means you develop new high BP after week 20 of your pregnancy  Gestational hypertension usually goes away after delivery, but it increases your risk for future chronic hypertension  Chronic hypertension with superimposed preeclampsia  is preeclampsia in a woman with a history of hypertension before pregnancy  It can also be preeclampsia that develops before week 20 of pregnancy  Preeclampsia  is high BP that usually develops after week 20 of pregnancy  It can also develop within 4 weeks of delivery  You may also have protein in your urine or damage to organs such as your kidneys or liver  Preeclampsia can lead to life-threatening conditions such as a stroke or eclampsia (seizures from severe high BP)  HELLP syndrome  is a life-threatening complication of high BP that may develop between week 20 of pregnancy and a few days after delivery  It causes destruction of red blood cells, liver problems, and blood clotting problems  Your risk for HELLP syndrome increases if you have a history of preeclampsia  Signs and symptoms  do not always happen with high BP  Hypertension may only be found during routine pregnancy checkups   You may have any of the following, depending on the kind of hypertension you have:  Headache or confusion    Spotted or blurred vision    Chest pain, trouble breathing, or a fast heartbeat    Dizziness or weakness    Abdominal pain, or pain on the right side of your upper abdomen, behind the ribs    Nausea and vomiting    Ringing or buzzing in your ears    Sudden weight gain or swelling in your face, arms, or legs    Severe fatigue that does not get better with rest    Call your local emergency number (911 in the [address]), or have someone else call if:   You have a seizure  You have chest pain  You faint or lose consciousness  You have trouble breathing  You have any of the following signs of a heart attack:      Squeezing, pressure, or pain in your chest    You may  also have any of the following:     Discomfort or pain in your back, neck, jaw, stomach, or arm    Shortness of breath    Nausea or vomiting    Lightheadedness or a sudden cold sweat    Seek care immediately if:   You have a severe headache or vision loss  You have weakness in an arm or leg  You urinate less than usual or stop urinating  You have fluid or blood leaking from your vagina that does not stop  You feel a gush of fluid from your vagina  You have severe abdominal pain with or without nausea and vomiting  You feel a change in your baby's movement, or you feel fewer than 6 to 10 movements in an hour  Call your doctor or obstetrician if:   You have new or increased swelling in your face or hands, or sudden weight gain  You have questions or concerns about your condition or care  How hypertension during pregnancy is diagnosed and treated: Your healthcare provider will ask about your symptoms  He or she will check your BP 2 times, at least 4 hours apart  You may need blood or urine tests to check for problems caused by hypertension  Treatment depends on how high your BP is and how many weeks you are into your pregnancy  Before 37 weeks, healthcare providers may want to monitor your condition if your BP is not severely high  An ultrasound may be done every 3 to 4 weeks to check your baby's growth  The amount of amniotic fluid may be measured every week  Your provider will tell you how often to come in for tests  Treatment may include any of the following:  Medicine  may be given to lower your BP or prevent seizures  The dose of current BP medicine you take may need to be changed   Daily low-dose aspirin may be recommended if you are at high risk for preeclampsia  Aspirin may help prevent preeclampsia or problems that it can cause  Your healthcare provider will tell you if you should take aspirin, and when to start  It is usually recommended from week 12 of pregnancy until delivery  Do not take aspirin unless directed by your healthcare provider  Ultrasounds  are used to check your baby's growth and make sure he or she is healthy  Delivery  may be recommended  Delivery may stop high BP or problems such as preeclampsia  Healthcare providers may deliver your baby right away if he or she is full-term (37 weeks or more)  You may need to deliver your baby early if you or the baby has life-threatening symptoms  Manage hypertension during pregnancy:  Your healthcare providers will tell you what BP is best for you during your pregnancy  They will help you create a plan to lower your BP safely and keep it at recommended levels  This is based on the kind of hypertension you have and how high your BP is  Sometimes it is difficult to diagnose a specific kind of hypertension, but you can still follow general guidelines:  Go to all scheduled appointments  Your healthcare providers will check your blood pressure and may order other tests  Rest as directed  Your healthcare provider may tell you to rest more often if you have mild symptoms of preeclampsia  Check your BP as directed if you have chronic hypertension  Sit and rest for 5 minutes before you take your BP  Extend your arm and support it on a flat surface  Your arm should be at the same level as your heart  Follow the directions that came with your BP monitor  Take your BP as often as directed  Keep a record of your BP readings and bring it to your follow-up visits  Do not drink alcohol or smoke  Alcohol, nicotine, and other chemicals in cigarettes and cigars can increase your BP  They can also harm your baby   Ask your healthcare provider for information if you currently drink alcohol or smoke and need help to quit  E-cigarettes or smokeless tobacco still contain nicotine  Talk to your healthcare provider before you use these products  Eat healthy foods  Healthy foods can help control your BP  Healthy foods include fruits, vegetables, whole-grain breads, low-fat dairy products, beans, lean meats, and fish  Ask if you need to be on a special diet  Exercise if directed  Exercise can help lower your BP  Ask your healthcare provider how much exercise you need and which exercise is right for you  Do kick counts as directed  You may need to keep track of how often your baby moves or kicks over a certain amount of time  Ask your obstetrician how to do kick counts and how often to do them  Check your weight each day  Weigh yourself every day before breakfast  Weight gain can be a sign of extra fluid in your body  Follow up with your doctor or obstetrician as directed:  Write down your questions so you remember to ask them during your visits  © Copyright Donna Valenzuela 2022 Information is for End User's use only and may not be sold, redistributed or otherwise used for commercial purposes  The above information is an  only  It is not intended as medical advice for individual conditions or treatments  Talk to your doctor, nurse or pharmacist before following any medical regimen to see if it is safe and effective for you

## 2023-03-20 NOTE — PROGRESS NOTES
Patient is here for prenatal ob visit today  No question's or concerns at this time  GA: 34w1d  Estimated Date of Delivery: 4/30/23  [id number]  Urine: Protein Trace / Glucose +1  Denies loss of fluid, vaginal bleeding and contractions  Good fetal movement  28 week labs are not completed  Declined Tdap vaccine

## 2023-03-20 NOTE — ASSESSMENT & PLAN NOTE
Weekly NST and MELIDA  Has not logged sugars this past week  Reports fasting BG is within ranges  Advised patient to submit BG log     Lab Results   Component Value Date    HGBA1C 5 4 12/30/2022

## 2023-03-20 NOTE — PROGRESS NOTES
Problem   Gestational Hypertension Without Significant Proteinuria in Third Trimester    Twice weekly NST and weekly MELIDA  Recommend weekly preeclamptic labs through her OB office  Recommend delivery after 37 weeks     Pre-Existing Type 2 Diabetes Mellitus During Pregnancy in Third Trimester    a1c at 11 wks= 6 6  On Metformin 1000 mg twice a day  Serial growth, APFS late third trimester     29 Weeks Gestation of Pregnancy    Blood Type: B positive  Antibody negative  Pap 10/10/2022  NILM  GC/CT -  negative  PN1 Labs- WNL H&H- 14 2/42  0  baseline preE labs wnl   28 Week Labs- ordered  COVID vaccine-   Flu vaccine - declined   Blue folder- Reviewed    Will be scheduled C section  Genetic screening- NIPT low risk  AFP- negative  Level 1- 10/28  Has VV with DP 10/21  Level 2- 11/18    Yellow folder- given   TDAP - declined  Delivery consent-  Breast pump -   Pediatrician - established    GBS swab -          34 weeks gestation of pregnancy  Rebecca Dee is a 35 y o  [id number]  34w1d who presents for routine PNV  Prepregnancy BMI 38 76 with a goal weight gain 5 kg (11 lb)-9 kg (19 lb)  TWG -0 544 kg (-1 lb 3 2 oz)  Denies OB complaints  Good fetal movement  Denies CTX/LOF/VB  3/17 growth scan showed macrosomia  Plan for rCS at 37 weeks for gHTN  Will send scheduling request to pool  Reviewed FKCs and warning signs of preE and PTL  Gestational hypertension without significant proteinuria in third trimester  BP normotensive today and asymptomatic  Patient reports home BP has been normotensive  Plan for 37 week delivery via rCS and weekly preE labs  Continue ASA til 36 weeks  Pre-existing type 2 diabetes mellitus during pregnancy in third trimester  Weekly NST and MELIDA  Has not logged sugars this past week  Reports fasting BG is within ranges  Advised patient to submit BG log     Lab Results   Component Value Date    HGBA1C 5 4 12/30/2022

## 2023-03-20 NOTE — ASSESSMENT & PLAN NOTE
Mercedes Clemente is a 35 y o  [id number]  34w1d who presents for routine PNV  Prepregnancy BMI 38 76 with a goal weight gain 5 kg (11 lb)-9 kg (19 lb)  TWG -0 544 kg (-1 lb 3 2 oz)  Denies OB complaints  Good fetal movement  Denies CTX/LOF/VB  3/17 growth scan showed macrosomia  Plan for rCS at 37 weeks for gHTN  Will send scheduling request to pool  Reviewed FKCs and warning signs of preE and PTL

## 2023-03-21 ENCOUNTER — ROUTINE PRENATAL (OUTPATIENT)
Dept: PERINATAL CARE | Facility: OTHER | Age: 33
End: 2023-03-21

## 2023-03-21 ENCOUNTER — TELEPHONE (OUTPATIENT)
Dept: OBGYN CLINIC | Facility: CLINIC | Age: 33
End: 2023-03-21

## 2023-03-21 VITALS
WEIGHT: 238.2 LBS | HEIGHT: 66 IN | HEART RATE: 102 BPM | DIASTOLIC BLOOD PRESSURE: 86 MMHG | BODY MASS INDEX: 38.28 KG/M2 | SYSTOLIC BLOOD PRESSURE: 138 MMHG

## 2023-03-21 DIAGNOSIS — Z3A.34 34 WEEKS GESTATION OF PREGNANCY: Primary | ICD-10-CM

## 2023-03-21 DIAGNOSIS — O24.113 PRE-EXISTING TYPE 2 DIABETES MELLITUS DURING PREGNANCY IN THIRD TRIMESTER: ICD-10-CM

## 2023-03-21 NOTE — PROGRESS NOTES
NST was done today  Pt  Reported active fetal movement at home between visit  Daily fetal kick count reviewed and emphasized  Patient verbalized understanding of all and was receptive  Late entry:    I entered the reason for NST by error as Melissa VALENTIN actially a  Type 2 pregestational Diabetic patient        Kina Valderrama RN

## 2023-03-21 NOTE — TELEPHONE ENCOUNTER
----- Message from Smith Padgett Ila  sent at 3/20/2023  2:40 PM EDT -----  Regarding:   Procedure to be scheduled: Margarita  CANDELARIA: 23  Indication for delivery: gestational hypertension  Requested date (s) of delivery:  4/10   If requested date is unavailable, is there a date by which the pt must be delivered? Recommended delivery at 37 weeks  Physician preference: [facility] - patient has upcoming appointment with her on

## 2023-03-22 NOTE — TELEPHONE ENCOUNTER
Troy, I could not get C section for 4/10 and have scheduled it for 4/11 at 10 AM  Dr Murtaza Johnson is on call that day  Is this OK? I have not called pt as yet   Thank you  Pt notified of c section date of 4/11 at 10 AM  Pt wants an appt with CS as she has never met her  Jone Lopez is working on it  Patient is aware and has appt with CS  Dr Murtaza Johnson,  patients section is 4/11 at 10 AM    Dr Octavio Guzman, remove section that was for 4/10

## 2023-03-24 ENCOUNTER — ULTRASOUND (OUTPATIENT)
Dept: PERINATAL CARE | Facility: OTHER | Age: 33
End: 2023-03-24

## 2023-03-24 VITALS
BODY MASS INDEX: 38.89 KG/M2 | DIASTOLIC BLOOD PRESSURE: 80 MMHG | SYSTOLIC BLOOD PRESSURE: 138 MMHG | WEIGHT: 242 LBS | HEIGHT: 66 IN | HEART RATE: 103 BPM

## 2023-03-24 DIAGNOSIS — O40.3XX0 POLYHYDRAMNIOS AFFECTING PREGNANCY IN THIRD TRIMESTER: ICD-10-CM

## 2023-03-24 DIAGNOSIS — O24.113 PRE-EXISTING TYPE 2 DIABETES MELLITUS DURING PREGNANCY IN THIRD TRIMESTER: ICD-10-CM

## 2023-03-24 DIAGNOSIS — Z3A.34 34 WEEKS GESTATION OF PREGNANCY: Primary | ICD-10-CM

## 2023-03-24 DIAGNOSIS — O13.9 GESTATIONAL HYPERTENSION AFFECTING THIRD PREGNANCY: ICD-10-CM

## 2023-03-24 NOTE — LETTER
2023    Denise Raymond [address]  [address] [address]    Patient: Karan Araujo   YOB: 1990   Date of Visit: 3/24/2023   Gestational age 35w7d   Martha Nelson of this communication: Routine though please note new finding of severe polyhydramnios today       Dear Dr Mamie Diaz,    This patient was seen recently in our  office  The content of my evaluation today is in the ultrasound report under "OB Procedures" tab  Please don't hesitate to contact our office with any concerns or questions       Sincerely,      Danica Go MD  Attending Physician, [place]

## 2023-03-24 NOTE — PATIENT INSTRUCTIONS
Kick Counts in Pregnancy   AMBULATORY CARE:   Kick counts  measure how much your baby is moving in your womb  A kick from your baby can be felt as a twist, turn, swish, roll, or jab  It is common to feel your baby kicking at 26 to 28 weeks of pregnancy  You may feel your baby kick as early as 20 weeks of pregnancy  You may want to start counting at 28 weeks  Contact your doctor immediately if:   You feel a change in the number of kicks or movements of your baby  You feel fewer than 10 kicks within 2 hours  You have questions or concerns about your baby's movements  Why measure kick counts:  Your baby's movement may provide information about your baby's health  He or she may move less, or not at all, if there are problems  Your baby may move less if he or she is not getting enough oxygen or nutrition from the placenta  Do not smoke while you are pregnant  Smoking decreases the amount of oxygen that gets to your baby  Talk to your healthcare provider if you need help to quit smoking  Tell your healthcare provider as soon as you feel a change in your baby's movements  When to measure kick counts:   Measure kick counts at the same time every day  Measure kick counts when your baby is awake and most active  Your baby may be most active in the evening  How to measure kick counts:  Check that your baby is awake before you measure kick counts  You can wake up your baby by lightly pushing on your belly, walking, or drinking something cold  Your healthcare provider may tell you different ways to measure kick counts  You may be told to do the following:  Use a chart or clock to keep track of the time you start and finish counting  Sit in a chair or lie on your left side  Place your hands on the largest part of your belly  Count until you reach 10 kicks  Write down how much time it takes to count 10 kicks  It may take 30 minutes to 2 hours to count 10 kicks   It should not take more than 2 hours to count 10 kicks  Follow up with your doctor as directed:  Write down your questions so you remember to ask them during your visits  © Copyright Veronica Hill 2022 Information is for End User's use only and may not be sold, redistributed or otherwise used for commercial purposes  The above information is an  only  It is not intended as medical advice for individual conditions or treatments  Talk to your doctor, nurse or pharmacist before following any medical regimen to see if it is safe and effective for you

## 2023-03-24 NOTE — PROGRESS NOTES
Repeat Non-Stress Testing:    Patient verbalizes +FM  Pt denies ALL:               Leaking of fluid   Contractions   Vaginal bleeding   Decreased fetal movement    Patient is performing daily kick counts  Patient has no questions or concerns  NST strip reviewed by Dr Abi Mancera

## 2023-03-27 ENCOUNTER — ROUTINE PRENATAL (OUTPATIENT)
Dept: PERINATAL CARE | Facility: OTHER | Age: 33
End: 2023-03-27

## 2023-03-27 VITALS
BODY MASS INDEX: 38.51 KG/M2 | HEIGHT: 66 IN | WEIGHT: 239.6 LBS | SYSTOLIC BLOOD PRESSURE: 138 MMHG | HEART RATE: 101 BPM | DIASTOLIC BLOOD PRESSURE: 85 MMHG

## 2023-03-27 DIAGNOSIS — O13.3 GESTATIONAL HYPERTENSION WITHOUT SIGNIFICANT PROTEINURIA IN THIRD TRIMESTER: Primary | ICD-10-CM

## 2023-03-27 DIAGNOSIS — O24.113 PRE-EXISTING TYPE 2 DIABETES MELLITUS DURING PREGNANCY IN THIRD TRIMESTER: ICD-10-CM

## 2023-03-27 DIAGNOSIS — Z3A.35 35 WEEKS GESTATION OF PREGNANCY: ICD-10-CM

## 2023-03-27 NOTE — PATIENT INSTRUCTIONS
Kick Counts in Pregnancy   AMBULATORY CARE:   Kick counts  measure how much your baby is moving in your womb  A kick from your baby can be felt as a twist, turn, swish, roll, or jab  It is common to feel your baby kicking at 26 to 28 weeks of pregnancy  You may feel your baby kick as early as 20 weeks of pregnancy  You may want to start counting at 28 weeks  Contact your doctor immediately if:   You feel a change in the number of kicks or movements of your baby  You feel fewer than 10 kicks within 2 hours  You have questions or concerns about your baby's movements  Why measure kick counts:  Your baby's movement may provide information about your baby's health  He or she may move less, or not at all, if there are problems  Your baby may move less if he or she is not getting enough oxygen or nutrition from the placenta  Do not smoke while you are pregnant  Smoking decreases the amount of oxygen that gets to your baby  Talk to your healthcare provider if you need help to quit smoking  Tell your healthcare provider as soon as you feel a change in your baby's movements  When to measure kick counts:   Measure kick counts at the same time every day  Measure kick counts when your baby is awake and most active  Your baby may be most active in the evening  How to measure kick counts:  Check that your baby is awake before you measure kick counts  You can wake up your baby by lightly pushing on your belly, walking, or drinking something cold  Your healthcare provider may tell you different ways to measure kick counts  You may be told to do the following:  Use a chart or clock to keep track of the time you start and finish counting  Sit in a chair or lie on your left side  Place your hands on the largest part of your belly  Count until you reach 10 kicks  Write down how much time it takes to count 10 kicks  It may take 30 minutes to 2 hours to count 10 kicks   It should not take more than 2 hours to count 10 kicks  Follow up with your doctor as directed:  Write down your questions so you remember to ask them during your visits  © Copyright [employer] 2022 Information is for End User's use only and may not be sold, redistributed or otherwise used for commercial purposes  The above information is an  only  It is not intended as medical advice for individual conditions or treatments  Talk to your doctor, nurse or pharmacist before following any medical regimen to see if it is safe and effective for you

## 2023-03-27 NOTE — PROGRESS NOTES
Repeat Non-Stress Testing:    Patient verbalizes +FM  Pt denies ALL:               Leaking of fluid   Contractions   Vaginal bleeding   Decreased fetal movement    Patient is performing daily kick counts  Patient has no questions or concerns  NST strip reviewed by Dr Kirsten Cowden

## 2023-03-28 ENCOUNTER — APPOINTMENT (OUTPATIENT)
Dept: LAB | Facility: HOSPITAL | Age: 33
End: 2023-03-28

## 2023-03-28 DIAGNOSIS — Z34.82 PRENATAL CARE, SUBSEQUENT PREGNANCY, SECOND TRIMESTER: ICD-10-CM

## 2023-03-28 LAB
ALBUMIN SERPL BCP-MCNC: 3.6 G/DL (ref 3.5–5)
ALP SERPL-CCNC: 121 U/L (ref 34–104)
ALT SERPL W P-5'-P-CCNC: 10 U/L (ref 7–52)
ANION GAP SERPL CALCULATED.3IONS-SCNC: 10 MMOL/L (ref 4–13)
AST SERPL W P-5'-P-CCNC: 12 U/L (ref 13–39)
BASOPHILS # BLD AUTO: 0.03 THOUSANDS/ÂΜL (ref 0–0.1)
BASOPHILS NFR BLD AUTO: 0 % (ref 0–1)
BILIRUB SERPL-MCNC: 0.42 MG/DL (ref 0.2–1)
BUN SERPL-MCNC: 9 MG/DL (ref 5–25)
CALCIUM SERPL-MCNC: 10.2 MG/DL (ref 8.4–10.2)
CHLORIDE SERPL-SCNC: 102 MMOL/L (ref 96–108)
CO2 SERPL-SCNC: 20 MMOL/L (ref 21–32)
CREAT SERPL-MCNC: 0.48 MG/DL (ref 0.6–1.3)
CREAT UR-MCNC: 72.9 MG/DL
DME PARACHUTE DELIVERY DATE REQUESTED: NORMAL
DME PARACHUTE ITEM DESCRIPTION: NORMAL
DME PARACHUTE ORDER STATUS: NORMAL
DME PARACHUTE SUPPLIER NAME: NORMAL
DME PARACHUTE SUPPLIER PHONE: NORMAL
EOSINOPHIL # BLD AUTO: 0.03 THOUSAND/ÂΜL (ref 0–0.61)
EOSINOPHIL NFR BLD AUTO: 0 % (ref 0–6)
ERYTHROCYTE [DISTWIDTH] IN BLOOD BY AUTOMATED COUNT: 12.3 % (ref 11.6–15.1)
GFR SERPL CREATININE-BSD FRML MDRD: 129 ML/MIN/1.73SQ M
GLUCOSE SERPL-MCNC: 187 MG/DL (ref 65–140)
HCT VFR BLD AUTO: 35.2 % (ref 34.8–46.1)
HGB BLD-MCNC: 12.4 G/DL (ref 11.5–15.4)
IMM GRANULOCYTES # BLD AUTO: 0.08 THOUSAND/UL (ref 0–0.2)
IMM GRANULOCYTES NFR BLD AUTO: 1 % (ref 0–2)
LYMPHOCYTES # BLD AUTO: 2.12 THOUSANDS/ÂΜL (ref 0.6–4.47)
LYMPHOCYTES NFR BLD AUTO: 16 % (ref 14–44)
MCH RBC QN AUTO: 30.4 PG (ref 26.8–34.3)
MCHC RBC AUTO-ENTMCNC: 35.2 G/DL (ref 31.4–37.4)
MCV RBC AUTO: 86 FL (ref 82–98)
MONOCYTES # BLD AUTO: 0.7 THOUSAND/ÂΜL (ref 0.17–1.22)
MONOCYTES NFR BLD AUTO: 5 % (ref 4–12)
NEUTROPHILS # BLD AUTO: 10.19 THOUSANDS/ÂΜL (ref 1.85–7.62)
NEUTS SEG NFR BLD AUTO: 78 % (ref 43–75)
NRBC BLD AUTO-RTO: 0 /100 WBCS
PLATELET # BLD AUTO: 300 THOUSANDS/UL (ref 149–390)
PMV BLD AUTO: 9.9 FL (ref 8.9–12.7)
POTASSIUM SERPL-SCNC: 4.2 MMOL/L (ref 3.5–5.3)
PROT SERPL-MCNC: 6.8 G/DL (ref 6.4–8.4)
PROT UR-MCNC: 18 MG/DL
PROT/CREAT UR: 0.25 MG/G{CREAT} (ref 0–0.1)
RBC # BLD AUTO: 4.08 MILLION/UL (ref 3.81–5.12)
SODIUM SERPL-SCNC: 132 MMOL/L (ref 135–147)
URATE SERPL-MCNC: 3.4 MG/DL (ref 2–7.5)
WBC # BLD AUTO: 13.15 THOUSAND/UL (ref 4.31–10.16)

## 2023-03-29 DIAGNOSIS — O21.9 NAUSEA AND VOMITING DURING PREGNANCY: ICD-10-CM

## 2023-03-29 LAB — TREPONEMA PALLIDUM IGG+IGM AB [PRESENCE] IN SERUM OR PLASMA BY IMMUNOASSAY: NORMAL

## 2023-03-29 RX ORDER — ONDANSETRON 4 MG/1
4 TABLET, ORALLY DISINTEGRATING ORAL EVERY 8 HOURS PRN
Qty: 30 TABLET | Refills: 0 | Status: SHIPPED | OUTPATIENT
Start: 2023-03-29

## 2023-03-29 NOTE — PROGRESS NOTES
Please refer to the [facility] ultrasound report in Ob Procedures for additional information regarding today's visit

## 2023-03-31 ENCOUNTER — TELEPHONE (OUTPATIENT)
Facility: HOSPITAL | Age: 33
End: 2023-03-31

## 2023-03-31 ENCOUNTER — ULTRASOUND (OUTPATIENT)
Dept: PERINATAL CARE | Facility: OTHER | Age: 33
End: 2023-03-31

## 2023-03-31 VITALS
DIASTOLIC BLOOD PRESSURE: 90 MMHG | SYSTOLIC BLOOD PRESSURE: 138 MMHG | HEIGHT: 66 IN | BODY MASS INDEX: 38.57 KG/M2 | WEIGHT: 240 LBS | HEART RATE: 99 BPM

## 2023-03-31 DIAGNOSIS — O24.113 PRE-EXISTING TYPE 2 DIABETES MELLITUS DURING PREGNANCY IN THIRD TRIMESTER: Primary | ICD-10-CM

## 2023-03-31 DIAGNOSIS — O13.3 GESTATIONAL HYPERTENSION, THIRD TRIMESTER: ICD-10-CM

## 2023-03-31 DIAGNOSIS — O40.3XX0 POLYHYDRAMNIOS IN SINGLETON PREGNANCY IN THIRD TRIMESTER: ICD-10-CM

## 2023-03-31 DIAGNOSIS — Z3A.35 35 WEEKS GESTATION OF PREGNANCY: ICD-10-CM

## 2023-03-31 NOTE — PROGRESS NOTES
"Repeat Non-Stress Testing:    Patient verbalizes +FM  Pt denies ALL:               Leaking of fluid   Contractions   Vaginal bleeding   Decreased fetal movement    Patient is performing daily kick counts  Patient has no questions or concerns  NST strip reviewed with Dr Daiana Temple was aware of the ctx calling them,\"cramps\" and rating the discomfort as 2 on a 10 scale  Her BP at home this am was 116/75 she denied headache, visual disturbance, epigastric pain or being sluggish  Her recent lab results were also reviewed by Dr Ahmadi  She is having her lab drawn weekly  She was able to tell me the signs and symptoms of Preeclampsia to me  She went home after NST was finished    "

## 2023-03-31 NOTE — TELEPHONE ENCOUNTER
Called patient, spoke with her and reminded her to update blood sugars  She will update today  She offers no questions or concerns at this time

## 2023-04-04 ENCOUNTER — ROUTINE PRENATAL (OUTPATIENT)
Dept: OBGYN CLINIC | Facility: CLINIC | Age: 33
End: 2023-04-04

## 2023-04-04 ENCOUNTER — ROUTINE PRENATAL (OUTPATIENT)
Dept: PERINATAL CARE | Facility: OTHER | Age: 33
End: 2023-04-04

## 2023-04-04 ENCOUNTER — DOCUMENTATION (OUTPATIENT)
Dept: PERINATAL CARE | Facility: CLINIC | Age: 33
End: 2023-04-04

## 2023-04-04 VITALS
WEIGHT: 241 LBS | HEIGHT: 66 IN | HEART RATE: 106 BPM | DIASTOLIC BLOOD PRESSURE: 82 MMHG | BODY MASS INDEX: 38.73 KG/M2 | SYSTOLIC BLOOD PRESSURE: 138 MMHG

## 2023-04-04 VITALS
WEIGHT: 239.8 LBS | BODY MASS INDEX: 38.54 KG/M2 | HEIGHT: 66 IN | DIASTOLIC BLOOD PRESSURE: 70 MMHG | SYSTOLIC BLOOD PRESSURE: 114 MMHG

## 2023-04-04 DIAGNOSIS — O34.219 PREVIOUS CESAREAN DELIVERY, ANTEPARTUM: ICD-10-CM

## 2023-04-04 DIAGNOSIS — Z3A.36 36 WEEKS GESTATION OF PREGNANCY: Primary | ICD-10-CM

## 2023-04-04 DIAGNOSIS — O24.113 PRE-EXISTING TYPE 2 DIABETES MELLITUS DURING PREGNANCY IN THIRD TRIMESTER: ICD-10-CM

## 2023-04-04 DIAGNOSIS — Z3A.36 36 WEEKS GESTATION OF PREGNANCY: ICD-10-CM

## 2023-04-04 DIAGNOSIS — Z34.83 ENCOUNTER FOR SUPERVISION OF OTHER NORMAL PREGNANCY, THIRD TRIMESTER: Primary | ICD-10-CM

## 2023-04-04 DIAGNOSIS — O13.3 GESTATIONAL HYPERTENSION WITHOUT SIGNIFICANT PROTEINURIA IN THIRD TRIMESTER: ICD-10-CM

## 2023-04-04 LAB
SL AMB  POCT GLUCOSE, UA: ABNORMAL
SL AMB POCT URINE PROTEIN: 0.3

## 2023-04-04 NOTE — ASSESSMENT & PLAN NOTE
Following with DP  Insulin 42 units at night  Lab Results   Component Value Date    HGBA1C 5 4 12/30/2022

## 2023-04-04 NOTE — PROGRESS NOTES
Repeat Non-Stress Testing:    Patient verbalizes +FM  Pt denies ALL:               Leaking of fluid   Contractions   Vaginal bleeding   Decreased fetal movement    Patient is performing daily kick counts  Patient has no questions or concerns  NST strip reviewed by Dr Karolina Gomes

## 2023-04-04 NOTE — PROGRESS NOTES
She was counseled on the risks of  including bleeding, infection, and injury to surrounding structures including the bowel and bladder  She was counseled that there are medical and surgical methods to manage excessive postpartum bleeding  She was counseled that in the event of excessive blood loss, she may require blood transfusion which includes a small risk of blood borne diseases such as hepatitis and HIV  The patient is OK with receiving a blood transfusion if necessary  Discussed rare risk of hysterectomy  Patient at increase risk of hemorrhage with polyhydramnios  The patient had an opportunity to ask questions and signed consent

## 2023-04-04 NOTE — PROGRESS NOTES
Previous  delivery, antepartum  Scheduled  with CS    Gestational hypertension without significant proteinuria in third trimester  Stop ASA  Twice weekly NST and MELIDA  Due for labs this week  Delivery planned 37+2    Pre-existing type 2 diabetes mellitus during pregnancy in third trimester  Following with DP  Insulin 42 units at night  Lab Results   Component Value Date    HGBA1C 5 4 2022       36 weeks gestation of pregnancy  34 yo  at 37+2 here for routine ob visit  Feeling well  No contractions, leaking or bleeding  Good fetal movement  GBS collected

## 2023-04-04 NOTE — PROGRESS NOTES
"Date: 23  Taco Guerita  1990  Estimated Date of Delivery: 23  36w2d  OB/GYN: Christiano Maldonado  Pre-existing type 2 DM with hyperglycemia in pregnancy  Additional Pregnancy Complications: Obesity and history of gestational diabetes          schedule Tuesday, 23    Medication  Lantus--Increase from 42 to 48 units at 9:30 PM  Metformin stopped on 23 due to intolerance  Diet: : 2400 calorie (CHO: 65-71-35-30-60-30) (PRO: 3-2-3/4-2-3/4-2)  Gestational diabetes meal plan; 3 meals and 3 snacks  Some protein food aversions  Can tolerate eggs, cheese, nuts ,peanut butter, some thinly sliced beef  No ground beef, pork or fish  Testing blood sugars: 4 x per day (Fasting, 2 hour after start of each meal)  Meter: OneTouch Verio Flex  Activity: Walks 30 minutes daily, follow OB recommendations  Support System: Significant Other / family   Patient Goal: \"I will eat 3 meals and 3 snacks each day, including protein at each\"  Education:  10/26/22 and 22 CRNP evaluation appointments  Class 2 completed with **Abbi Emmanuel 22       Weight Change:    Pre-gravid weight: 109 kg (240 lb)  0 454 kg (1 lb)  Weight gain recommendations: BMI (> 30) 11-20 lbs    Ultrasounds  3/31//23 MELIDA indicated polyhydramnios  3/17/23 Growth Scan; AC->97% & EFW-93%  Next US: MELIDA-23    Labs  No results found for: [id number]  Lab Results   Component Value Date    GLUF 107 (H) 10/03/2022     Lab Results   Component Value Date    HGBA1C 5 4 2022     Further fetal surveillance  Beginning at 32 weeks, NST / MELIDA twice a week, if indicated    Date to report next: Friday, 23    Rivas Metcalf RD,LDN,CDE  Diabetes Educator  "

## 2023-04-04 NOTE — PROGRESS NOTES
Nonstress test at 36-2/7 weeks  Indication gestational hypertension pregestational diabetes  Interpretation reactive  Plan: Twice a week nonstress test, once a week MELIDA, daily fetal movement counts  Chevis Gilford Julious Pitts MD, [address] [facility]  Maternal Fetal Medicine

## 2023-04-04 NOTE — PROGRESS NOTES
Prenatal Visit 36w2d    Patient denies any lof or vag  Patient is having lots of cramping  Patient has +fm  Patient urine is 0 3 protein/trace glucose  GBS collected today  Labs utd   scheduled for  with Dr Bonilla  Need to sign delivery consent  Surgical soap and instructions given  Patient has no concerns today

## 2023-04-04 NOTE — ASSESSMENT & PLAN NOTE
36 yo  at 36+2 here for routine ob visit  Feeling well  No contractions, leaking or bleeding  Good fetal movement  GBS collected

## 2023-04-04 NOTE — PATIENT INSTRUCTIONS
Kick Counts in Pregnancy   AMBULATORY CARE:   Kick counts  measure how much your baby is moving in your womb  A kick from your baby can be felt as a twist, turn, swish, roll, or jab  It is common to feel your baby kicking at 26 to 28 weeks of pregnancy  You may feel your baby kick as early as 20 weeks of pregnancy  You may want to start counting at 28 weeks  Contact your doctor immediately if:   You feel a change in the number of kicks or movements of your baby  You feel fewer than 10 kicks within 2 hours  You have questions or concerns about your baby's movements  Why measure kick counts:  Your baby's movement may provide information about your baby's health  He or she may move less, or not at all, if there are problems  Your baby may move less if he or she is not getting enough oxygen or nutrition from the placenta  Do not smoke while you are pregnant  Smoking decreases the amount of oxygen that gets to your baby  Talk to your healthcare provider if you need help to quit smoking  Tell your healthcare provider as soon as you feel a change in your baby's movements  When to measure kick counts:   Measure kick counts at the same time every day  Measure kick counts when your baby is awake and most active  Your baby may be most active in the evening  How to measure kick counts:  Check that your baby is awake before you measure kick counts  You can wake up your baby by lightly pushing on your belly, walking, or drinking something cold  Your healthcare provider may tell you different ways to measure kick counts  You may be told to do the following:  Use a chart or clock to keep track of the time you start and finish counting  Sit in a chair or lie on your left side  Place your hands on the largest part of your belly  Count until you reach 10 kicks  Write down how much time it takes to count 10 kicks  It may take 30 minutes to 2 hours to count 10 kicks   It should not take more than 2 hours to count 10 kicks  Follow up with your doctor as directed:  Write down your questions so you remember to ask them during your visits  © Copyright [employer] 2022 Information is for End User's use only and may not be sold, redistributed or otherwise used for commercial purposes  The above information is an  only  It is not intended as medical advice for individual conditions or treatments  Talk to your doctor, nurse or pharmacist before following any medical regimen to see if it is safe and effective for you

## 2023-04-06 LAB — GP B STREP DNA SPEC QL NAA+PROBE: NEGATIVE

## 2023-04-11 PROBLEM — Z3A.37 37 WEEKS GESTATION OF PREGNANCY: Status: ACTIVE | Noted: 2022-09-23

## 2023-04-11 PROBLEM — O40.3XX0 POLYHYDRAMNIOS AFFECTING PREGNANCY IN THIRD TRIMESTER: Status: RESOLVED | Noted: 2022-09-18 | Resolved: 2023-04-11

## 2023-04-11 PROBLEM — O14.00 MILD PREECLAMPSIA: Status: ACTIVE | Noted: 2023-03-06

## 2023-04-11 PROBLEM — Z98.891 STATUS POST REPEAT LOW TRANSVERSE CESAREAN SECTION: Status: ACTIVE | Noted: 2022-09-23

## 2023-04-12 PROBLEM — Z98.891 HISTORY OF 3 CESAREAN SECTIONS: Status: ACTIVE | Noted: 2022-09-23

## 2023-04-14 PROBLEM — O14.10 PREECLAMPSIA, SEVERE: Status: ACTIVE | Noted: 2023-03-06

## 2023-05-04 DIAGNOSIS — O14.13 SEVERE PRE-ECLAMPSIA IN THIRD TRIMESTER: ICD-10-CM

## 2023-05-04 RX ORDER — NIFEDIPINE 30 MG/1
60 TABLET, EXTENDED RELEASE ORAL DAILY
Qty: 180 TABLET | Refills: 1 | Status: SHIPPED | OUTPATIENT
Start: 2023-05-04

## 2023-05-09 PROBLEM — O40.3XX0 POLYHYDRAMNIOS IN SINGLETON PREGNANCY IN THIRD TRIMESTER: Status: RESOLVED | Noted: 2023-03-31 | Resolved: 2023-05-09

## 2023-05-09 NOTE — PROGRESS NOTES
Postpartum clinic visit   Assessment:  Diagnoses and all orders for this visit:    Postpartum exam    Status post repeat low transverse  section    Hypertension in pregnancy, preeclampsia, severe, delivered    Encounter for initial prescription of contraceptive pills  -     norethindrone (Chelle) 0 35 MG tablet; Take 1 tablet (0 35 mg total) by mouth daily    Pre-existing type 2 diabetes mellitus during pregnancy in third trimester      Pre-existing type 2 diabetes mellitus during pregnancy in third trimester  Previously controlled with metformin  Has appt coming up with PCP for management  Lab Results   Component Value Date    HGBA1C 5 4 2022       Hypertension in pregnancy, preeclampsia, severe, delivered  BP normotensive today  Asymptomatic  Currently on Procardia 60mg  Has cuff at home but does not routinely check  We discussed tapering down dosage with home BP monitoring  Status post repeat low transverse  section  Incision C/D/I  Well healed and approximated lower transverse surgical scar  No surgical site pain  Lochia wnl  No breast pain or issues with breastfeeding  Postpartum exam  Contraception: POP   Reviewed postpartum perineal care and guidelines on resuming sexual activity  [facility] recommended for lactation consultation, mental health support, and additional resources  Advised to call with any issues, all concerns & questions addressed  See provided information in your after visit summary     Follow up: 3 months for annual     Subjective:    Diane Juan presents for her post partum visit  She delivered on 23 via rLTCS at 37+2 weeks gestation by Dr Bonilla  Pregnancy complicated by pre-existing type 2 DM, polyhydramnios, chronic hypertension, short interval pregnancy, obesity, and depression  Uncomplicated delivery  The baby girl weighed 8 lbs 13 8 oz and is doing well  Apgar 4/9 at 1 and 5 minutes     Patient is breast feeding  Denies breast pain  The patient currently has thin lochia  Denies vaginal pain, discharge, itching, odor  She denies abdominal/pelvic pain, bowel/bladder dysfunction  Patient has not resumed sexually active  Contraceptive plan is POPs  Postpartum depression screening: negative  EPDS 8  Patient reports no additional complaints  Patient's last menstrual period was 2022 (exact date)  Past Medical History:   Diagnosis Date   • Asthma     no meds/inhaler  in remission   • Depression     bipolar  300 mg serroquel  80 mg prozac   • Gestational diabetes     both pregnancies   • Polyhydramnios affecting pregnancy in third trimester 2022    Severe, 41 cm, noted on 3/24   • Preeclampsia     right after delivery with first    • Type 2 diabetes mellitus ([facility] [address] )        • Varicella     had vaccines     Past Surgical History:   Procedure Laterality Date   •  SECTION      2018   • LAPAROSCOPY      left salpingectomy    • ND  DELIVERY ONLY N/A 2023    Procedure:  SECTION () REPEAT;  Surgeon: Eric Franco MD;  Location: [facility] ;  Service: Obstetrics         There is no immunization history on file for this patient      Family History   Problem Relation Age of Onset   • No Known Problems Mother    • Addiction problem Father    • Cancer Maternal Grandmother    • Diabetes Maternal Grandmother    • Heart failure Maternal Grandmother    • Tuberculosis Maternal Grandfather    • No Known Problems Paternal Grandmother    • No Known Problems Paternal Grandfather      Social History     Tobacco Use   • Smoking status: Former   • Smokeless tobacco: Never   Vaping Use   • Vaping Use: Former   Substance Use Topics   • Alcohol use: Not Currently     Comment: none with pregnancy   • Drug use: Yes     Types: Marijuana     Comment: medical marijuana pt-for mental health, rx'd by MD-has not used x 1 wk       Current Outpatient Medications:   • acetaminophen (TYLENOL) 325 mg tablet, Take 2 tablets (650 mg total) by mouth every 6 (six) hours, Disp: , Rfl: 0  •  Blood Glucose Monitoring Suppl (OneTouch Verio Flex System) w/Device KIT, Dispense 1 kit per insurance formulary  , Disp: 1 kit, Rfl: 0  •  Blood Glucose Monitoring Suppl (OneTouch Verio Reflect) w/Device KIT, DX: E11 9, THREE TIMES A DAY, Disp: , Rfl:   •  docusate sodium (COLACE) 100 mg capsule, Take 1 capsule (100 mg total) by mouth 2 (two) times a day, Disp: , Rfl: 0  •  FLUoxetine (PROzac) 40 MG capsule, Take 80 mg by mouth daily, Disp: , Rfl:   •  ibuprofen (MOTRIN) 600 mg tablet, Take 1 tablet (600 mg total) by mouth every 6 (six) hours, Disp: 30 tablet, Rfl: 0  •  Insulin Pen Needle 31G X 5 MM MISC, Inject under the skin daily at bedtime Use one a day or as directed , Disp: 100 each, Rfl: 1  •  Lancets (onetouch ultrasoft) lancets, TESTING 3X DAILY DX E11 9, Disp: , Rfl:   •  NIFEdipine (PROCARDIA XL) 30 mg 24 hr tablet, TAKE 2 TABLETS (60 MG TOTAL) BY MOUTH IN THE MORNING, Disp: 180 tablet, Rfl: 1  •  NIFEdipine (PROCARDIA XL) 60 mg 24 hr tablet, , Disp: , Rfl:   •  OneTouch [employer]ica Lancets 86Q MISC, Use 6 a day or as directed  T2DM and pregnancy  , Disp: 100 each, Rfl: 6  •  pantoprazole (PROTONIX) 20 mg tablet, TAKE 2 TABLETS (40 MG TOTAL) BY MOUTH DAILY  , Disp: 180 tablet, Rfl: 1  •  Prenatal MV-Min-Fe Fum-FA-DHA (PRENATAL 1 PO), Take by mouth, Disp: , Rfl:   •  QUEtiapine (SEROquel) 300 mg tablet, Take 300 mg by mouth daily at bedtime, Disp: , Rfl:   Patient Active Problem List    Diagnosis Date Noted   • Hypertension in pregnancy, preeclampsia, severe, delivered    • Umbilical cord complication    • Abnormal umbilical cord    • BMI 37 0-37 9, adult 10/26/2022   • History of 3  sections 2022   • History of pre-eclampsia in prior pregnancy, currently pregnant 2022   • Pre-existing type 2 diabetes mellitus during pregnancy in third trimester 2022   • Status post repeat low transverse  section 2022   • Lack of immunity to hepatitis B virus demonstrated by serologic test 2022   • PTSD (post-traumatic stress disorder) 2022   • Granuloma annulare 2022   • Anxiety 2022   • Obesity affecting pregnancy in third trimester 2022   • Marijuana use 2022   • Short interval between pregnancies complicating pregnancy, antepartum 2022   • Encounter for screening for maternal depression 2022   • Depression affecting pregnancy, antepartum 2022       No Known Allergies    OB History    Para Term  AB Living   3 3 3     3   SAB IAB Ectopic Multiple Live Births         0 3      # Outcome Date GA Lbr Osito/2nd Weight Sex Delivery Anes PTL Lv   3 Term 23 37w2d  4020 g (8 lb 13 8 oz) F CS-LTranv Spinal  GAMALIEL   2 Term     M CS-Unspec   GAMAILEL   1 Term     M CS-Unspec   GAMALIEL       There were no vitals filed for this visit  There is no height or weight on file to calculate BMI  Review of Systems   Constitutional: Negative for chills and fever  Eyes: Negative for visual disturbance  Respiratory: Negative for shortness of breath  Cardiovascular: Negative for chest pain and leg swelling  Gastrointestinal: Negative for abdominal pain and constipation  Genitourinary: Positive for vaginal bleeding  Negative for difficulty urinating, pelvic pain, vaginal discharge and vaginal pain  Musculoskeletal: Negative for back pain and myalgias  Skin: Negative for pallor and rash  Neurological: Negative for dizziness, light-headedness and headaches  Psychiatric/Behavioral: Negative for dysphoric mood  Physical Exam  Constitutional:       General: She is not in acute distress  Appearance: Normal appearance  She is not ill-appearing  HENT:      Head: Normocephalic and atraumatic     Eyes:      Conjunctiva/sclera: Conjunctivae normal    Pulmonary:      Effort: Pulmonary effort is normal    Abdominal:      General: A surgical scar is present  There is no distension  Palpations: Abdomen is soft  Tenderness: There is no abdominal tenderness  Musculoskeletal:         General: Normal range of motion  Cervical back: Neck supple  Neurological:      Mental Status: She is alert and oriented to person, place, and time  Skin:     General: Skin is warm and dry  Psychiatric:         Mood and Affect: Mood normal          Behavior: Behavior normal    Vitals and nursing note reviewed

## 2023-05-10 ENCOUNTER — POSTPARTUM VISIT (OUTPATIENT)
Age: 33
End: 2023-05-10

## 2023-05-10 VITALS
BODY MASS INDEX: 34.87 KG/M2 | WEIGHT: 217 LBS | HEIGHT: 66 IN | DIASTOLIC BLOOD PRESSURE: 86 MMHG | SYSTOLIC BLOOD PRESSURE: 114 MMHG

## 2023-05-10 DIAGNOSIS — O24.113 PRE-EXISTING TYPE 2 DIABETES MELLITUS DURING PREGNANCY IN THIRD TRIMESTER: ICD-10-CM

## 2023-05-10 DIAGNOSIS — Z98.891 STATUS POST REPEAT LOW TRANSVERSE CESAREAN SECTION: ICD-10-CM

## 2023-05-10 DIAGNOSIS — Z30.011 ENCOUNTER FOR INITIAL PRESCRIPTION OF CONTRACEPTIVE PILLS: ICD-10-CM

## 2023-05-10 PROBLEM — O09.299 HISTORY OF PRE-ECLAMPSIA IN PRIOR PREGNANCY, CURRENTLY PREGNANT: Status: RESOLVED | Noted: 2022-09-23 | Resolved: 2023-05-10

## 2023-05-10 PROBLEM — O99.340 DEPRESSION AFFECTING PREGNANCY, ANTEPARTUM: Status: RESOLVED | Noted: 2022-09-18 | Resolved: 2023-05-10

## 2023-05-10 PROBLEM — F32.A DEPRESSION AFFECTING PREGNANCY, ANTEPARTUM: Status: RESOLVED | Noted: 2022-09-18 | Resolved: 2023-05-10

## 2023-05-10 PROBLEM — Z13.32 ENCOUNTER FOR SCREENING FOR MATERNAL DEPRESSION: Status: RESOLVED | Noted: 2022-09-18 | Resolved: 2023-05-10

## 2023-05-10 PROBLEM — O99.213 OBESITY AFFECTING PREGNANCY IN THIRD TRIMESTER: Status: RESOLVED | Noted: 2022-09-18 | Resolved: 2023-05-10

## 2023-05-10 PROBLEM — O09.899 SHORT INTERVAL BETWEEN PREGNANCIES COMPLICATING PREGNANCY, ANTEPARTUM: Status: RESOLVED | Noted: 2022-09-18 | Resolved: 2023-05-10

## 2023-05-10 RX ORDER — ACETAMINOPHEN AND CODEINE PHOSPHATE 120; 12 MG/5ML; MG/5ML
1 SOLUTION ORAL DAILY
Qty: 84 TABLET | Refills: 0 | Status: SHIPPED | OUTPATIENT
Start: 2023-05-10

## 2023-05-10 NOTE — PROGRESS NOTES
Patient presents today for her routine postpartum visit  Delivered on 4/11/23 by Dr Kathryn Sales via CS Baby Girl, 8lb 13 8oz  EPDS reviewed-score 8  States she is breast feeding  Sexually active- Not currently   BC option- Would like to discuss OCP's   Breast pain- pain when she has to nurse  Abdominal pain- None  Vaginal pain/Bleeding- None / still bleeding   No questions or concerns today

## 2023-05-10 NOTE — ASSESSMENT & PLAN NOTE
BP normotensive today  Asymptomatic  Currently on Procardia 60mg  Has cuff at home but does not routinely check  We discussed tapering down dosage with home BP monitoring

## 2023-05-10 NOTE — PATIENT INSTRUCTIONS
Self Care After Delivery   AMBULATORY CARE:   The postpartum period  is the period of time from delivery to about 6 weeks  During this time you may experience many physical and emotional changes  It is important to understand what is normal and when you need to call your healthcare provider  It is also important to know how to care for yourself during this time  Call your local emergency number (911 in the [address] [place] [address],[address]) for any of the following: You see or hear things that are not there, or have thoughts of harming yourself or your baby  You soak through 1 pad in 15 minutes, have blurry vision, clammy or pale skin, and feel faint  You faint or lose consciousness  You have trouble breathing  You cough up blood  Your  incision comes apart  Seek care immediately if:   Your heart is beating faster than usual     You have a bad headache or changes in your vision  Your episiotomy or  incision is red, swollen, bleeding, or draining pus  You have severe abdominal pain  Call your doctor or obstetrician if:   Your leg is painful, red, and larger than usual     You soak through 1 or more pads in an hour, or pass blood clots larger than a quarter from your vagina  You have a fever  You have new or worsening pain in your abdomen or vagina  You continue to have depression 1 to 2 weeks after you deliver  You have trouble sleeping  You have foul-smelling discharge from your vagina  You have pain or burning when you urinate  You do not have a bowel movement for 3 days or more  You have nausea or are vomiting  You have hard lumps or red streaks over your breasts  You have cracked nipples or bleed from your nipples  You have questions or concerns about your condition or care  Physical changes:   The following are normal changes after you give birth:  Pain in the area between your anus and vagina    Breast pain    Constipation or hemorrhoids    Hot or cold flashes    Vaginal bleeding or discharge    Mild to moderate abdominal cramping    Difficulty controlling bowel movements or urine    Emotional changes:  A drop in hormone levels after you deliver may cause changes in your emotions  You may feel irritable, sad, or anxious  You may cry easily or for no reason  You may also feel depressed  Depression that continues can be a sign of postpartum depression, a condition that can be treated  Treatment may include talk therapy, medicines, or both  Healthcare providers will ask how you are feeling and if you have any depression  These talks can happen during appointments for your medical care and for your baby's care, such as well child visits  Providers can help you find ways to care for yourself and your baby  Talk to your providers about the following:  When emotional changes or depression started, and if it is getting worse over time    Problems you are having with daily activities, sleep, or caring for your baby    If anything makes you feel worse, or makes you feel better    Feeling that you are not bonding with your baby the way you want    Any problems your baby has with sleeping or feeding    Your baby is fussy or cries a lot    Support you have from friends, family, or others    Breast care for breastfeeding mothers: You may have sore breasts for 3 to 6 days after you give birth  This happens as your milk begins to fill your breasts  You may also have sore breasts if you do not breastfeed frequently  Do the following to care for your breasts:  Apply a moist, warm, compress to your breast as directed  This may help soothe your breasts  Make sure the washcloth is not too hot before you apply it to your breast     Nurse your baby or pump your milk frequently  This may prevent clogged milk ducts  Ask your healthcare provider how often to nurse or pump  Massage your breasts as directed  This may help increase your milk flow   Gently rub your breasts in a circular motion before you breastfeed  You may need to gently squeeze your breast or nipple to help release milk  You can also use a breast pump to help release milk from your breast     Wash your breasts with warm water only  Do not put soap on your nipples  Soap may cause your nipples to become dry  Apply lanolin cream to your nipples as directed  Lanolin cream may add moisture to your skin and prevent nipple dryness  Always  wash off lanolin cream with warm water before you breastfeed  Place pads in your bra  Your nipples may leak milk when you are not breastfeeding  You can place pads inside of your bra to help prevent leaking onto your clothing  Ask your healthcare provider where to purchase bra pads  Get breastfeeding support if needed  Healthcare providers can answer questions about breastfeeding and provide you with support  Ask your healthcare provider who you can contact if you need breastfeeding support  Breast care for non-breastfeeding mothers:  Milk will fill your breasts even if you bottle feed your baby  Do the following to help stop your milk from filling your breasts and causing pain:  Wear a bra with support at all times  A sports bra or a tight-fitting bra will help stop your milk from coming in  Apply ice on each breast for 15 to 20 minutes every hour or as directed  Use an ice pack, or put crushed ice in a plastic bag  Cover it with a towel before you apply it to your breast  Ice helps your milk ducts shrink  Keep your breasts away from warm water  Warm water will make it easier for milk to fill your breasts  Stand with your breasts away from warm water in the shower  Limit how much you touch your breasts  This will prevent them from filling with milk  Perineum care: Your perineum is the area between your rectum and vagina  It is normal to have swelling and pain in this area after you give birth   If you had an episiotomy, your healthcare provider may give you special instructions  Clean your perineum after you use the bathroom  This may prevent infection and help with healing  Use a spray bottle with warm water to clean your perineum  You may also gently spray warm water against your perineum when you urinate  Always wipe front to back  Take a sitz bath as directed  A sitz bath may help relieve swelling and pain  Fill your bath tub or bucket with water up to your hips and sit in the water  Use cold water for 2 days after you deliver  Then use warm water  Ask your healthcare provider for more information about a sitz bath  Apply ice packs for the first 24 hours or as directed  Use a plastic glove filled with ice or buy an ice pack  Wrap the ice pack or plastic glove in a small towel or wash cloth  Place the ice pack on your perineum for 20 minutes at a time  Sit on a donut-shaped pillow  This may relieve pressure on your perineum when you sit  Use wipes that contain medicine or take pills as directed  Your healthcare provider may tell you to use witch hazel pads  You can place witch hazel pads in the refrigerator before you apply them to your perineum  Your provider may also tell you to take NSAIDs  Ask him or her how often to take pills or use the wipes  Do not go swimming or take tub baths for 4 to 6 weeks or as directed  This will help prevent an infection in your vagina or uterus  Bowel and bladder care: It may take 3 to 5 days to have a bowel movement after you deliver your baby  You can do the following to prevent or manage constipation, and get control of your bowel or bladder:  Take stool softeners as directed  A stool softener is medicine that will make your bowel movements softer  This may prevent or relieve constipation  A stool softener may also make bowel movements less painful  Drink plenty of liquids  Ask how much liquid to drink each day and which liquids are best for you  Liquids may help prevent constipation      Eat foods high in fiber   Examples include fruits, vegetables, grains, beans, and lentils  Ask your healthcare provider how much fiber you need each day  Fiber may prevent constipation  Do Kegel exercises as directed  Kegel exercises will help strengthen the muscles that control bowel movements and urination  Ask your healthcare provider for more information on Kegel exercises  Apply cold compresses or medicine to hemorrhoids as directed  This may relieve swelling and pain  Your healthcare provider may tell you to apply ice or wipes that contain medicine to your hemorrhoids  He or she may also tell you to use a sitz bath  Ask your provider for more information on how to manage hemorrhoids  Nutrition:  Good nutrition is important in the postpartum period  It will help you return to a healthy weight, increase your energy levels, and prevent constipation  It will also help you get enough nutrients and calories if you are going to breastfeed your baby  Eat a variety of healthy foods  Healthy foods include fruits, vegetables, whole-grain breads, low-fat dairy products, beans, lean meats, and fish  You may need 500 to 700 extra calories each day if you breastfeed your baby  You may also need extra protein  Limit foods with added sugar and high amounts of fat  These foods are high in calories and low in healthy nutrients  Read food labels so you know how much sugar and fat is in the food you want to eat  Drink 8 to 10 glasses of water per day  Water will help you make plenty of milk for your baby  It will also help prevent constipation  Drink a glass of water every time you breastfeed your baby  Take vitamins as directed  Ask your healthcare provider what vitamins you need  Limit caffeine and alcohol if you are breastfeeding  Caffeine and alcohol can get into your breast milk  Caffeine and alcohol can make your baby fussy  They can also interfere with your baby's sleep   Ask your healthcare provider if you can drink alcohol or caffeine  Rest and sleep: You may feel very tired in the postpartum period  Enough sleep will help you heal and give you energy to care for your baby  The following may help you get sleep and rest:  Nap when your baby naps  Your baby may nap several times during the day  Get rest during this time  Limit visitors  Many people may want to see you and your baby  Ask friends or family to visit on different days  This will give you time to rest     Do not plan too much for one day  Put off household chores so that you have time to rest  Gradually do more each day  Ask for help from family, friends, or neighbors  Ask them to help you with laundry, cleaning, or errands  Also ask someone to watch the baby while you take a nap or relax  Ask your partner to help with the care of your baby  Pump some of your breast milk so your partner can feed your baby during the night  Exercise after delivery:  Wait until your healthcare provider says it is okay to exercise  Exercise can help you lose weight, increase your energy levels, and manage your mood  It can also prevent constipation and blood clots  Start with gentle exercises such as walking  Do more as you have more energy  You may need to avoid abdominal exercises for 1 to 2 weeks after you deliver  Talk to your healthcare provider about an exercise plan that is right for you  Sexual activity after delivery:   Do not have sex until your healthcare provider says it is okay  You may need to wait 4 to 6 weeks before you have sex  This may prevent infection and allow time to heal     Your menstrual cycle may begin as soon as 3 weeks after you deliver  Your period may be delayed if you breastfeed your baby  You can become pregnant before you get your first postpartum period  Talk to your healthcare provider about birth control that is right for you  Some types of birth control are not safe during breastfeeding      For support and more information:  Join a support group for new mothers  Ask for help from family and friends with chores, errands, and care of your baby  Office of Benson Araiza,  Department of Health and Human Services  [address], [address] [facility] [address]  [address], [address] [facility] [address]  Phone: [phone]  Web Address: www womenshealth gov  [facility] Postpartum [address] [facility] [place] , [address] [facility]  [address] [facility] , [address] [place] [address]  Web Address: [email or web address]  Follow up with your doctor or obstetrician as directed: You will need to follow up within 2 to 6 weeks of delivery  Write down your questions so you remember to ask them at your visits  © Copyright Cam Conalini 2022 Information is for End User's use only and may not be sold, redistributed or otherwise used for commercial purposes  The above information is an  only  It is not intended as medical advice for individual conditions or treatments  Talk to your doctor, nurse or pharmacist before following any medical regimen to see if it is safe and effective for you  Breastfeeding and Breast Engorgement   WHAT YOU NEED TO KNOW:   What do I need to know about breast engorgement? Breast engorgement develops when too much milk builds up in your breast  It is normal for your breasts to feel swollen, heavy, and tender when your milk comes in  This is called breast fullness  When your breast starts to feel painful and hard, the fullness has developed into engorgement  Breast engorgement usually happens 3 to 5 days after you give birth  Engorgement can happen if you are not breastfeeding or expressing milk often, or produce a lot of milk  Your baby may have a hard time latching on (attaching) to your breast to feed  Without treatment, engorgement can lead to plugged milk ducts or a breast infection called mastitis     What are the signs and symptoms of breast engorgement? A swollen, tender breast    A breast that feels hard to the touch or looks tight or shiny    Warm, red, or throbbing breast    Flat nipple    A low fever    What can I do to manage my symptoms? Breastfeed or pump every 2 or 3 hours  Frequent breastfeeding helps decrease engorgement discomfort  Express or pump milk from your breasts before you breastfeed  This will help soften your breast and your nipple, and allow your baby to latch on better  Massage your breast   Breast massage helps empty your engorged breast and decrease pain  Gently massage your breast before and during breastfeeding to help increase your milk flow  Gently stroke your breast, starting from the outer areas and working your way toward the nipple  Breast massage may also help prevent breast engorgement if done in the first few days after you give birth  Apply a cool compress in between feedings  The cold may help decrease swelling and pain in your engorged breast  Wet a washcloth in cold water, wring it out, and place it on your breast  Ask how long and how often to use a cool compress  Wear a supportive bra  The bra should fit well but not be too tight  What can I do to prevent breast engorgement? Help your baby get a good latch  Hold the nape of his or her neck to help him or her latch onto your breast  Touch his or her top lip with your nipple and wait for him or her to open his or her mouth wide  Your baby's lower lip and chin should touch the areola (dark area around the nipple) first  Help him or her get as much of the areola in his or her mouth as possible  You should feel as if your baby will not separate from your breast easily  Gently break suction and reposition if your baby is only sucking on the nipple  Talk to a lactation consultant if you need help with your baby's latch  Empty your breasts completely    Take your time when you breastfeed to allow your baby to empty your breast  Try not to switch breasts too early  Express or pump after you breastfeed if your baby is not emptying your breasts when he or she feeds  Apply warmth to your breast before you breastfeed  Put a warm, wet cloth on your breast or take a warm shower  This can help increase your milk flow  When should I seek immediate care? You have a fever with chills or body aches  You have pain and swelling in one or both breasts that keeps you from breastfeeding  When should I contact my healthcare provider? You have a tender breast lump that grows slowly and usually forms on one side of your breast     You have a small, white bump on your nipple  Your symptoms do not get better within 24 hours  You have questions or concerns about your condition or care  Where can I get more information? [employer] of [address] [place], [place] [address]Dickson vasquez Raul Morales  Phone: [phone]  Web Address: http://www brown [facility]/    [address] [place]  Phone: 0- [phone]  Phone: [phone]- [phone]  Web Address: http://"[employer]" \[facility]\""/  org  CARE AGREEMENT:   You have the right to help plan your care  Learn about your health condition and how it may be treated  Discuss treatment options with your healthcare providers to decide what care you want to receive  You always have the right to refuse treatment  The above information is an  only  It is not intended as medical advice for individual conditions or treatments  Talk to your doctor, nurse or pharmacist before following any medical regimen to see if it is safe and effective for you  © Copyright Shirin Cadet 2022 Information is for End User's use only and may not be sold, redistributed or otherwise used for commercial purposes

## 2023-05-10 NOTE — ASSESSMENT & PLAN NOTE
Incision C/D/I  Well healed and approximated lower transverse surgical scar  No surgical site pain  Lochia wnl  No breast pain or issues with breastfeeding

## 2023-05-10 NOTE — ASSESSMENT & PLAN NOTE
Previously controlled with metformin  Has appt coming up with PCP for management     Lab Results   Component Value Date    HGBA1C 5 4 12/30/2022

## 2023-05-10 NOTE — ASSESSMENT & PLAN NOTE
Contraception: POP   Reviewed postpartum perineal care and guidelines on resuming sexual activity  Baby and [facility] recommended for lactation consultation, mental health support, and additional resources  Advised to call with any issues, all concerns & questions addressed     See provided information in your after visit summary

## 2023-07-31 DIAGNOSIS — Z30.011 ENCOUNTER FOR INITIAL PRESCRIPTION OF CONTRACEPTIVE PILLS: ICD-10-CM

## 2023-07-31 RX ORDER — ACETAMINOPHEN AND CODEINE PHOSPHATE 120; 12 MG/5ML; MG/5ML
1 SOLUTION ORAL DAILY
Qty: 84 TABLET | Refills: 0 | Status: SHIPPED | OUTPATIENT
Start: 2023-07-31

## 2023-08-15 PROBLEM — Z98.891 STATUS POST REPEAT LOW TRANSVERSE CESAREAN SECTION: Status: RESOLVED | Noted: 2022-09-23 | Resolved: 2023-08-15

## 2023-09-13 DIAGNOSIS — K21.9 GASTROESOPHAGEAL REFLUX DISEASE WITHOUT ESOPHAGITIS: ICD-10-CM

## 2023-09-13 RX ORDER — PANTOPRAZOLE SODIUM 20 MG/1
40 TABLET, DELAYED RELEASE ORAL DAILY
Qty: 60 TABLET | Refills: 5 | Status: SHIPPED | OUTPATIENT
Start: 2023-09-13

## 2023-10-10 ENCOUNTER — ANNUAL EXAM (OUTPATIENT)
Dept: OBGYN CLINIC | Facility: CLINIC | Age: 33
End: 2023-10-10
Payer: COMMERCIAL

## 2023-10-10 VITALS
SYSTOLIC BLOOD PRESSURE: 130 MMHG | WEIGHT: 237.2 LBS | BODY MASS INDEX: 38.12 KG/M2 | DIASTOLIC BLOOD PRESSURE: 80 MMHG | HEIGHT: 66 IN

## 2023-10-10 DIAGNOSIS — Z01.419 ENCOUNTER FOR ANNUAL ROUTINE GYNECOLOGICAL EXAMINATION: Primary | ICD-10-CM

## 2023-10-10 PROCEDURE — 99395 PREV VISIT EST AGE 18-39: CPT

## 2023-10-10 RX ORDER — ALPRAZOLAM 0.25 MG/1
TABLET ORAL
COMMUNITY
Start: 2023-09-14

## 2023-10-10 NOTE — PATIENT INSTRUCTIONS
Breastfeeding and Breast Engorgement   WHAT YOU NEED TO KNOW:   What do I need to know about breast engorgement? Breast engorgement develops when too much milk builds up in your breast. It is normal for your breasts to feel swollen, heavy, and tender when your milk comes in. This is called breast fullness. When your breast starts to feel painful and hard, the fullness has developed into engorgement. Breast engorgement usually happens 3 to 5 days after you give birth. Engorgement can happen if you are not breastfeeding or expressing milk often, or produce a lot of milk. Your baby may have a hard time latching on (attaching) to your breast to feed. Without treatment, engorgement can lead to plugged milk ducts or a breast infection called mastitis. What are the signs and symptoms of breast engorgement? A swollen, tender breast    A breast that feels hard to the touch or looks tight or shiny    Warm, red, or throbbing breast    Flat nipple    A low fever    What can I do to manage my symptoms? Breastfeed or pump every 2 or 3 hours. Frequent breastfeeding helps decrease engorgement discomfort. Express or pump milk from your breasts before you breastfeed. This will help soften your breast and your nipple, and allow your baby to latch on better. Massage your breast.  Breast massage helps empty your engorged breast and decrease pain. Gently massage your breast before and during breastfeeding to help increase your milk flow. Gently stroke your breast, starting from the outer areas and working your way toward the nipple. Breast massage may also help prevent breast engorgement if done in the first few days after you give birth. Apply a cool compress in between feedings. The cold may help decrease swelling and pain in your engorged breast. Wet a washcloth in cold water, wring it out, and place it on your breast. Ask how long and how often to use a cool compress. Wear a supportive bra.   The bra should fit well but not be too tight. What can I do to prevent breast engorgement? Help your baby get a good latch. Hold the nape of his or her neck to help him or her latch onto your breast. Touch his or her top lip with your nipple and wait for him or her to open his or her mouth wide. Your baby's lower lip and chin should touch the areola (dark area around the nipple) first. Help him or her get as much of the areola in his or her mouth as possible. You should feel as if your baby will not separate from your breast easily. Gently break suction and reposition if your baby is only sucking on the nipple. Talk to a lactation consultant if you need help with your baby's latch. Empty your breasts completely. Take your time when you breastfeed to allow your baby to empty your breast. Try not to switch breasts too early. Express or pump after you breastfeed if your baby is not emptying your breasts when he or she feeds. Apply warmth to your breast before you breastfeed. Put a warm, wet cloth on your breast or take a warm shower. This can help increase your milk flow. When should I seek immediate care? You have a fever with chills or body aches. You have pain and swelling in one or both breasts that keeps you from breastfeeding. When should I contact my healthcare provider? You have a tender breast lump that grows slowly and usually forms on one side of your breast.    You have a small, white bump on your nipple. Your symptoms do not get better within 24 hours. You have questions or concerns about your condition or care. Where can I get more information? [employer] of [address] [facility] , [address] [facility]  Phone: [phone]  Web Address: [email or web address]    [facility]  [address]   [place] , [facility]  Phone: [phone]  Phone: 2- [phone]  Web Address: [email or web address] org  CARE AGREEMENT:   You have the right to help plan your care. Learn about your health condition and how it may be treated. Discuss treatment options with your healthcare providers to decide what care you want to receive. You always have the right to refuse treatment. The above information is an  only. It is not intended as medical advice for individual conditions or treatments. Talk to your doctor, nurse or pharmacist before following any medical regimen to see if it is safe and effective for you. © Copyright Melissa Díaz 2023 Information is for End User's use only and may not be sold, redistributed or otherwise used for commercial purposes.

## 2023-10-10 NOTE — ASSESSMENT & PLAN NOTE
ASCCP guidelines reviewed. Pap due 2027  Self breast awareness encouraged. Perineal hygiene reviewed. Kegel exercises recommended. Daily exercise and healthy diet encouraged.    F/U Annually and PRN

## 2023-10-10 NOTE — PROGRESS NOTES
Assessment/Plan:    Encounter for annual routine gynecological examination  ASCCP guidelines reviewed. Pap due   Self breast awareness encouraged. Perineal hygiene reviewed. Kegel exercises recommended. Daily exercise and healthy diet encouraged. F/U Annually and PARESH Chucey Zeyad was seen today for gynecologic exam.    Diagnoses and all orders for this visit:    Encounter for annual routine gynecological examination        Health Maintenance:    Last PAP: 10/10/2022. Neg/neg  Next PAP Due: 10/2027    Subjective    CC: Yearly Exam     Helder Dumont is a 35 y.o. female  here for an annual exam.      Tereso Allen  Patient's last menstrual period was 10/06/2023. Sexual activity: She is sexually active without pain, bleeding or dryness. Contraception: POPs  STD testing:  She does not want STD testing today. Non smoker, social drinker    Delivered 23 via LTCS - healthy female  measuring 8 lb 13.8 oz at 37w2d gestation. Still breastfeeding without concerns. Periods resumed 2 months ago. Reports regular but heavy cycles. Has not started her Micronor yet but plans to start today. She has no concerns today. She denies breast concerns, abnormal vaginal discharge, vaginal itching, odor, irritation, bowel/bladder dysfunction, urinary symptoms, pelvic pain, or dyspareunia today.      Family hx of breast cancer: Yes MGM  Family hx of ovarian cancer: No  Family hx of colon cancer: No    Past Medical History:   Diagnosis Date   • Asthma     no meds/inhaler  in remission   • Depression     bipolar  300 mg serroquel  80 mg prozac   • Gestational diabetes     both pregnancies   • Polyhydramnios affecting pregnancy in third trimester 2022    Severe, 41 cm, noted on 3/24   • Preeclampsia     right after delivery with first 2018   • Type 2 diabetes mellitus ([address])        • Varicella     had vaccines     Past Surgical History:   Procedure Laterality Date   •  SECTION      2018   • LAPAROSCOPY left salpingectomy    • IA  DELIVERY ONLY N/A 2023    Procedure:  SECTION () REPEAT;  Surgeon: Radha White MD;  Location: [facility] ;  Service: Obstetrics         There is no immunization history on file for this patient. Family History   Problem Relation Age of Onset   • No Known Problems Mother    • Addiction problem Father    • Breast cancer Maternal Grandmother    • Cancer Maternal Grandmother    • Diabetes Maternal Grandmother    • Heart failure Maternal Grandmother    • Tuberculosis Maternal Grandfather    • No Known Problems Paternal Grandmother    • No Known Problems Paternal Grandfather    • Ovarian cancer Neg Hx    • Cervical cancer Neg Hx    • Uterine cancer Neg Hx      Social History     Tobacco Use   • Smoking status: Former   • Smokeless tobacco: Never   Vaping Use   • Vaping Use: Former   Substance Use Topics   • Alcohol use: Yes   • Drug use: Yes     Types: Marijuana     Comment: medical marijuana pt-for mental health, rx'd by MD-has not used x 1 wk       Current Outpatient Medications:   •  ALPRAZolam (XANAX) 0.25 mg tablet, TAKE ONE TABLET TWICE A DAY IF NEEDED, Disp: , Rfl:   •  FLUoxetine (PROzac) 40 MG capsule, Take 80 mg by mouth daily, Disp: , Rfl:   •  ibuprofen (MOTRIN) 600 mg tablet, Take 1 tablet (600 mg total) by mouth every 6 (six) hours, Disp: 30 tablet, Rfl: 0  •  metFORMIN (GLUCOPHAGE) 500 mg tablet, TAKE 1 TABLET BY MOUTH ONCE DAILY FOR 1 WEEK, THEN INCREASE TO TWICE DAILY, Disp: , Rfl:   •  norethindrone (MICRONOR) 0.35 MG tablet, TAKE 1 TABLET BY MOUTH EVERY DAY, Disp: 84 tablet, Rfl: 0  •  pantoprazole (PROTONIX) 20 mg tablet, TAKE 2 TABLETS (40 MG TOTAL) BY MOUTH DAILY. , Disp: 60 tablet, Rfl: 5  •  Prenatal MV-Min-Fe Fum-FA-DHA (PRENATAL 1 PO), Take by mouth, Disp: , Rfl:   •  QUEtiapine (SEROquel) 300 mg tablet, Take 300 mg by mouth daily at bedtime, Disp: , Rfl:   •  Insulin Pen Needle 31G X 5 MM MISC, Inject under the skin daily at bedtime Use one a day or as directed., Disp: 100 each, Rfl: 1  •  NIFEdipine (PROCARDIA XL) 30 mg 24 hr tablet, TAKE 2 TABLETS (60 MG TOTAL) BY MOUTH IN THE MORNING (Patient not taking: Reported on 10/10/2023), Disp: 180 tablet, Rfl: 1  Patient Active Problem List    Diagnosis Date Noted   • Encounter for annual routine gynecological examination 10/10/2023   • BMI 37.0-37.9, adult 10/26/2022   • History of 3  sections 2022   • Pre-existing type 2 diabetes mellitus during pregnancy in third trimester 2022   • Lack of immunity to hepatitis B virus demonstrated by serologic test 2022   • PTSD (post-traumatic stress disorder) 2022   • Granuloma annulare 2022   • Anxiety 2022   • Marijuana use 2022       No Known Allergies    OB History    Para Term  AB Living   3 3 3     3   SAB IAB Ectopic Multiple Live Births         0 3      # Outcome Date GA Lbr Osito/2nd Weight Sex Delivery Anes PTL Lv   3 Term 23 37w2d  4020 g (8 lb 13.8 oz) F CS-LTranv Spinal  GAMALIEL   2 Term     M CS-Unspec   GAMALIEL   1 Term 2018    M CS-Unspec   GAMALIEL       Vitals:    10/10/23 1329   BP: 130/80   BP Location: Left arm   Patient Position: Sitting   Weight: 108 kg (237 lb 3.2 oz)   Height: 5' 6" (1.676 m)     Body mass index is 38.29 kg/m². Review of Systems   Constitutional: Negative for chills and fever. Gastrointestinal: Negative for abdominal pain, constipation, diarrhea, nausea and vomiting. Genitourinary: Negative for difficulty urinating, dyspareunia, dysuria, frequency, menstrual problem, pelvic pain, urgency, vaginal bleeding, vaginal discharge and vaginal pain. Musculoskeletal: Negative for back pain and myalgias. Skin: Negative for pallor and rash. Neurological: Negative for headaches. Hematological: Negative for adenopathy. Psychiatric/Behavioral: Negative for dysphoric mood.         Physical Exam  Constitutional:       General: She is not in acute distress. Appearance: Normal appearance. She is not ill-appearing. Genitourinary:      Urethral meatus normal.      No lesions in the vagina. Right Labia: No rash, tenderness, lesions or skin changes. Left Labia: No tenderness, lesions, skin changes or rash. No inguinal adenopathy present in the right or left side. No vaginal discharge, erythema, tenderness, bleeding or ulceration. Right Adnexa: not tender, not full and no mass present. Left Adnexa: not tender, not full and no mass present. Cervix is parous. No cervical motion tenderness, discharge, friability, lesion, polyp, nabothian cyst, eversion or elongation. Uterus is not enlarged, fixed or tender. No uterine mass detected. No urethral prolapse, tenderness or discharge present. Bladder is not tender and urgency on palpation not present. Pelvic exam was performed with patient in the lithotomy position. Breasts:     Right: No swelling, inverted nipple, mass, nipple discharge, skin change or tenderness. Left: No swelling, inverted nipple, mass, nipple discharge, skin change or tenderness. HENT:      Head: Normocephalic and atraumatic. Eyes:      Conjunctiva/sclera: Conjunctivae normal.   Pulmonary:      Effort: Pulmonary effort is normal.   Abdominal:      General: There is no distension. Palpations: Abdomen is soft. Tenderness: There is no abdominal tenderness. Musculoskeletal:         General: Normal range of motion. Cervical back: Neck supple. Lymphadenopathy:      Upper Body:      Right upper body: No supraclavicular or axillary adenopathy. Left upper body: No supraclavicular or axillary adenopathy. Lower Body: No right inguinal adenopathy. No left inguinal adenopathy. Neurological:      Mental Status: She is alert and oriented to person, place, and time. Skin:     General: Skin is warm and dry.    Psychiatric:         Mood and Affect: Mood normal. Behavior: Behavior normal.         Thought Content: Thought content normal.         Judgment: Judgment normal.   Vitals and nursing note reviewed.

## 2023-10-26 NOTE — LETTER
March 31, 2023     [facility]  [address]    Patient: Surendra Burris   YOB: 1990   Date of Visit: 3/31/2023       Dear Dr Alejandro Cid: Thank you for referring Surendra Burris to me for evaluation  Below are my notes for this consultation  If you have questions, please do not hesitate to call me  I look forward to following your patient along with you           Sincerely,        Oleg Knutson MD        CC: No Recipients  Oleg Knutson MD  3/29/2023  9:39 AM  Sign when Signing Visit  Please refer to the [facility] ultrasound report in Ob Procedures for additional information regarding today's visit
ambulatory

## 2023-12-09 PROBLEM — Z01.419 ENCOUNTER FOR ANNUAL ROUTINE GYNECOLOGICAL EXAMINATION: Status: RESOLVED | Noted: 2023-10-10 | Resolved: 2023-12-09

## 2023-12-17 DIAGNOSIS — Z30.011 ENCOUNTER FOR INITIAL PRESCRIPTION OF CONTRACEPTIVE PILLS: ICD-10-CM

## 2023-12-18 RX ORDER — ACETAMINOPHEN AND CODEINE PHOSPHATE 120; 12 MG/5ML; MG/5ML
1 SOLUTION ORAL DAILY
Qty: 84 TABLET | Refills: 0 | Status: SHIPPED | OUTPATIENT
Start: 2023-12-18

## 2024-03-17 DIAGNOSIS — Z30.011 ENCOUNTER FOR INITIAL PRESCRIPTION OF CONTRACEPTIVE PILLS: ICD-10-CM

## 2024-03-18 RX ORDER — ACETAMINOPHEN AND CODEINE PHOSPHATE 120; 12 MG/5ML; MG/5ML
1 SOLUTION ORAL DAILY
Qty: 84 TABLET | Refills: 1 | Status: SHIPPED | OUTPATIENT
Start: 2024-03-18

## 2024-03-26 NOTE — LETTER
March 13, 2023     Jeannine [address] [facility] [id number]    Patient: Stone Mahajan   YOB: 1990   Date of Visit: 3/13/2023       Dear Gina Rivera: Thank you for referring Stone Mahajan to me for evaluation  Below are my notes for this consultation  If you have questions, please do not hesitate to call me  I look forward to following your patient along with you  Sincerely,        Karel Musa MD        CC: [facility]: Ms Celena Coleman was seen today for NST (found under the pregnancy episode) which I reviewed the RN assessment and agree  See ultrasound report under "OB Procedures" tab        Xin Kendall has Type 2 pregestational diabetes and gestational hypertension  Her BP today was 142/90  She denies any symptoms of preeclampsia  I messaged her after our visit to check in regarding her home BP monitoring   Her most recent gestational hypertension labs were normal       - Continue twice weekly NST with weekly MELIDA  - Weekly preeclampsia labs recommended  - Delivery is recommended at 37 weeks for gestational hypertension and T2DM     Please don't hesitate to contact our office with any concerns or questions   -Karel Musa MD    n/a

## 2024-04-02 ENCOUNTER — TELEPHONE (OUTPATIENT)
Age: 34
End: 2024-04-02

## 2024-04-02 NOTE — TELEPHONE ENCOUNTER
Patient aware she needs no pre-testing before her viability scan. She is due for her period in a couple days. Aware once she skips her period if there is any bleeding or cramping to call the office. We otherwise will see her at her ultrasound visit.

## 2024-04-02 NOTE — TELEPHONE ENCOUNTER
Pt called to sched appt for positive preg. Pt has not missed a period yet but her last one was 3/8/24. Pt stated she knew she was preg she felt preg. Pt took multiple test and they came out positive. Pt is 3.4 weeks today. Sched pt for [facility] and [facility] for 5/9/24 when she is a little over 8 weeks but pt wanted to be sure the doctor does not want to see her earlier due to a history of high risk pregnancies. Please call pt back if she should come in sooner.

## 2024-06-27 ENCOUNTER — NURSE TRIAGE (OUTPATIENT)
Age: 34
End: 2024-06-27

## 2024-06-27 DIAGNOSIS — B37.31 YEAST INFECTION INVOLVING THE VAGINA AND SURROUNDING AREA: Primary | ICD-10-CM

## 2024-06-27 NOTE — TELEPHONE ENCOUNTER
Patient believes she has a yeast infection. She is having itching inside and outside vagina, with white discharge. Can something be sent in or does she need to be seen

## 2024-06-28 RX ORDER — FLUCONAZOLE 150 MG/1
150 TABLET ORAL DAILY
Qty: 1 TABLET | Refills: 0 | Status: SHIPPED | OUTPATIENT
Start: 2024-06-28 | End: 2024-06-29

## 2024-06-28 NOTE — TELEPHONE ENCOUNTER
"Reason for Disposition  • Symptoms of a yeast infection (i.e., itchy, white discharge, not bad smelling) and feels like prior vaginal yeast infections    Answer Assessment - Initial Assessment Questions  1. SYMPTOM: \"What's the main symptom you're concerned about?\" (e.g., pain, itching, dryness)      White thick discharge, itching  2. LOCATION: \"Where is the  s/s located?\" (e.g., inside/outside, left/right)      Initially internal, now external  3. ONSET: \"When did the  s/s  start?\"      1 week ago  4. PAIN: \"Is there any pain?\" If Yes, ask: \"How bad is it?\" (Scale: 1-10; mild, moderate, severe)      Denies  5. ITCHING: \"Is there any itching?\" If Yes, ask: \"How bad is it?\" (Scale: 1-10; mild, moderate, severe)      10/10  6. CAUSE: \"What do you think is causing the discharge?\" \"Have you had the same problem before? What happened then?\"      Yeast  7. OTHER SYMPTOMS: \"Do you have any other symptoms?\" (e.g., fever, itching, vaginal bleeding, pain with urination, injury to genital area, vaginal foreign body)      Denies  8. PREGNANCY: \"Is there any chance you are pregnant?\" \"When was your last menstrual period?\"      Denies    Protocols used: Vaginal Symptoms-ADULT-[place]    "

## 2024-06-28 NOTE — TELEPHONE ENCOUNTER
"RN placed a call back to patient. Per patient itching 10/10 which started internally but now worse externally. Also noted onset of white thick discharge. Denies fever, pain, vaginal bleeding or other concerns. Symptoms started last week. Pt has been applying topical vagisil which helps a little bit. RN advised will send a prescription to Diflucan to preferred pharmacy on file. Advised continue to use topical vagisil as needed, and call back if symptoms do not fully resolve within a few days. Pt agreeable to plan. No further questions.     Answer Assessment - Initial Assessment Questions  1. SYMPTOM: \"What's the main symptom you're concerned about?\" (e.g., pain, itching, dryness)      White thick discharge, itching  2. LOCATION: \"Where is the  s/s located?\" (e.g., inside/outside, left/right)      Initially internal, now external  3. ONSET: \"When did the  s/s  start?\"      1 week ago  4. PAIN: \"Is there any pain?\" If Yes, ask: \"How bad is it?\" (Scale: 1-10; mild, moderate, severe)      Denies  5. ITCHING: \"Is there any itching?\" If Yes, ask: \"How bad is it?\" (Scale: 1-10; mild, moderate, severe)      10/10  6. CAUSE: \"What do you think is causing the discharge?\" \"Have you had the same problem before? What happened then?\"      Yeast  7. OTHER SYMPTOMS: \"Do you have any other symptoms?\" (e.g., fever, itching, vaginal bleeding, pain with urination, injury to genital area, vaginal foreign body)      Denies  8. PREGNANCY: \"Is there any chance you are pregnant?\" \"When was your last menstrual period?\"      Denies    Protocols used: Vaginal Symptoms-ADULT-[place]    "

## 2024-10-09 ENCOUNTER — NURSE TRIAGE (OUTPATIENT)
Age: 34
End: 2024-10-09

## 2024-10-09 DIAGNOSIS — N89.8 VAGINAL ITCHING: Primary | ICD-10-CM

## 2024-10-09 RX ORDER — FLUCONAZOLE 150 MG/1
150 TABLET ORAL DAILY
Qty: 1 TABLET | Refills: 0 | Status: SHIPPED | OUTPATIENT
Start: 2024-10-09 | End: 2024-10-10

## 2024-10-09 NOTE — TELEPHONE ENCOUNTER
"Patient recently had strep throat and took antibiotics. She states she has vaginal itching, white vaginal discharge and believes she has a yeast infection as a result of antibiotic usage. Symptoms started 6 days ago. She denies odor, abdominal pian, pain with urination, fever and any other symptoms at this time. States itching is severe. Diflucan ordered at [facility]. Routing to provider.   Advised patient to call back if symptoms dont resolve or worsen.         Answer Assessment - Initial Assessment Questions  1. SYMPTOM: \"What's the main symptom you're concerned about?\" (e.g., pain, itching, dryness)      Vaginal itching   2. LOCATION: \"Where is the  itching  located?\" (e.g., inside/outside, left/right)      Inside and outside   3. ONSET: \"When did the  symptoms  start?\"      6 days ago   4. PAIN: \"Is there any pain?\" If Yes, ask: \"How bad is it?\" (Scale: 1-10; mild, moderate, severe)      No   5. ITCHING: \"Is there any itching?\" If Yes, ask: \"How bad is it?\" (Scale: 1-10; mild, moderate, severe)      9  6. CAUSE: \"What do you think is causing the discharge?\" \"Have you had the same problem before? What happened then?\"      yeast  7. OTHER SYMPTOMS: \"Do you have any other symptoms?\" (e.g., fever, itching, vaginal bleeding, pain with urination, injury to genital area, vaginal foreign body)      White thick discharge  8. PREGNANCY: \"Is there any chance you are pregnant?\" \"When was your last menstrual period?\"      LMP: 10/8/24    Protocols used: Vaginal Symptoms-ADULT-[place]  \"How many yeast infections have you had in the past 2 years?\"    -If 1 or less, prescribe Diflucan 150mg PO. If no improvement after 1 dose treatment, please instruct patient to call back to schedule appointment. (should be seen within 3 days)    -If more than 4 or more yeast infections in a year or if they are recurrent, schedule appointment within 3 days.    For OB patients: if patient wishes to use over the counter monistat, recommend only the 7 " day treatment.

## 2024-11-11 ENCOUNTER — NURSE TRIAGE (OUTPATIENT)
Age: 34
End: 2024-11-11

## 2024-11-11 NOTE — TELEPHONE ENCOUNTER
Regarding: severe vaginal itching w/ red bumps  ----- Message from Rosa CASEY sent at 11/11/2024 10:36 AM EST -----  Established pt called reporting severe vaginal itching with reddish raised bumps, severe irritation around labia. Pt reports recently finishing 2 rounds of antibiotics. She is a pt of [facility], okay to be seen at [place] or [place] if concern warrants an office visit. Thank you.

## 2024-11-11 NOTE — TELEPHONE ENCOUNTER
"Melissa reporting severe vaginal itching/irritation with red bumps on labia x one week.   Area is painful to touch    Recently on ABX and treated with diflucan last month. Patient states if she wasn't happily  would be concerned for STD.    Attempted to schedule via book it. No availability. Attempted warm transfer to clerical- Lillian who requested HP IB message be forwarded to clinical.    Advised open to air at bedtime, avoid perfume soaps and products, recommend cotton underwear only, no thongs, wear looser fit clothing, change out of wet clothing after exercise and/or bathing suits ASAP.  wash with Cetaphil cleanserMay use A&D/Aquaphor oint externally to alleviate irration,-, watch sugar and carb intake.    HP IB message sent to kandis Corbin for Disposition   MODERATE-SEVERE itching (i.e., interferes with school, work, or sleep)    Answer Assessment - Initial Assessment Questions  1. SYMPTOM: \"What's the main symptom you're concerned about?\" (e.g., pain, itching, dryness)      Vaginal bumps/irritation  2. LOCATION: \"Where is the  irritation/rash located?\" (e.g., inside/outside, left/right)      vagina  3. ONSET: \"When did the  rash/itching  start?\"      Wtihin the week  4. PAIN: \"Is there any pain?\" If Yes, ask: \"How bad is it?\" (Scale: 1-10; mild, moderate, severe)      Yes, when touching-moderate to severe  5. ITCHING: \"Is there any itching?\" If Yes, ask: \"How bad is it?\" (Scale: 1-10; mild, moderate, severe)      Yes- 10  6. CAUSE: \"What do you think is causing the discharge?\" \"Have you had the same problem before?\" \"What happened then?\"      Unsure, concerned for STD  7. OTHER SYMPTOMS: \"Do you have any other symptoms?\" (e.g., fever, itching, vaginal bleeding, pain with urination, injury to genital area, vaginal foreign body)      Currently on menses-  8. PREGNANCY: \"Is there any chance you are pregnant?\" \"When was your last menstrual period?\"      Has had menses this past week.    Protocols " used: Vaginal Symptoms-Adult-[place]

## 2024-11-22 ENCOUNTER — TELEPHONE (OUTPATIENT)
Age: 34
End: 2024-11-22

## 2024-11-22 NOTE — TELEPHONE ENCOUNTER
Patient called and canceled her appointment today due to the weather and I do not see any openings . She wants to go to [facility] location.  Please call her back at [phone] . Thank you

## 2024-12-06 DIAGNOSIS — K21.9 GASTROESOPHAGEAL REFLUX DISEASE WITHOUT ESOPHAGITIS: ICD-10-CM

## 2024-12-06 RX ORDER — PANTOPRAZOLE SODIUM 20 MG/1
40 TABLET, DELAYED RELEASE ORAL DAILY
Qty: 60 TABLET | Refills: 5 | Status: SHIPPED | OUTPATIENT
Start: 2024-12-06

## 2025-01-03 DIAGNOSIS — K21.9 GASTROESOPHAGEAL REFLUX DISEASE WITHOUT ESOPHAGITIS: ICD-10-CM

## 2025-01-06 RX ORDER — PANTOPRAZOLE SODIUM 20 MG/1
40 TABLET, DELAYED RELEASE ORAL DAILY
Qty: 180 TABLET | Refills: 2 | OUTPATIENT
Start: 2025-01-06

## 2025-03-20 ENCOUNTER — OFFICE VISIT (OUTPATIENT)
Dept: OBGYN CLINIC | Facility: MEDICAL CENTER | Age: 35
End: 2025-03-20
Payer: COMMERCIAL

## 2025-03-20 VITALS — DIASTOLIC BLOOD PRESSURE: 110 MMHG | SYSTOLIC BLOOD PRESSURE: 160 MMHG

## 2025-03-20 DIAGNOSIS — Z32.02 PREGNANCY TEST NEGATIVE: ICD-10-CM

## 2025-03-20 DIAGNOSIS — B37.31 VULVAL CANDIDIASIS: Primary | ICD-10-CM

## 2025-03-20 DIAGNOSIS — R03.0 ELEVATED BLOOD PRESSURE READING: ICD-10-CM

## 2025-03-20 LAB — SL AMB POCT URINE HCG: NORMAL

## 2025-03-20 PROCEDURE — 99213 OFFICE O/P EST LOW 20 MIN: CPT | Performed by: PHYSICIAN ASSISTANT

## 2025-03-20 PROCEDURE — 81025 URINE PREGNANCY TEST: CPT | Performed by: PHYSICIAN ASSISTANT

## 2025-03-20 RX ORDER — FLUCONAZOLE 150 MG/1
150 TABLET ORAL
Qty: 2 TABLET | Refills: 0 | Status: SHIPPED | OUTPATIENT
Start: 2025-03-20 | End: 2025-03-24

## 2025-03-20 RX ORDER — NYSTATIN AND TRIAMCINOLONE ACETONIDE 100000; 1 [USP'U]/G; MG/G
OINTMENT TOPICAL 2 TIMES DAILY
Qty: 30 G | Refills: 0 | Status: SHIPPED | OUTPATIENT
Start: 2025-03-20 | End: 2025-04-03

## 2025-03-24 NOTE — PROGRESS NOTES
Name: Melissa Garrison      : 1990      MRN: [id number]  Encounter Provider: Leona Clifton PA-C  Encounter Date: 3/20/2025   Encounter department: [facility] [place]  :  Assessment & Plan  Vulval candidiasis  -ingrown hair vs small epidermal cyst; advised warm compresses. Reviewed precuations  - evidence of skin candidiasis. Rx sent for PO diflucan and topical mycolog  Orders:    fluconazole (DIFLUCAN) 150 mg tablet; Take 1 tablet (150 mg total) by mouth every third day for 2 doses    nystatin-triamcinolone (MYCOLOG-II) ointment; Apply topically 2 (two) times a day for 14 days    Pregnancy test negative    Orders:    POCT urine HCG    Elevated blood pressure reading  - pt suspects related to rushing to get to office and being late today  - she will monitor BP at home. Call if persistently elevated           History of Present Illness   HPI  Melissa Garrison is a 35 y.o. female who presents to the office today for a problem visit.    Pt arrived 15 minutes after her appointment time today.    She reports a small vaginal lump vs sore on the right vulvar. Reports a significant amount of itching and irritation in the b/l groin and vulva as well. Denies change in vaginal discharge, odor, fever, chills, hematuria, dysuria.    She additionally has concerns today about possible pregnancy. Due for menses any day. She has some back pain which is a common sign of early pregnancy for this pt. Agreeable to hcg testing in office today    Hcg testing negative       Review of Systems   Constitutional: Negative.    Respiratory: Negative.     Cardiovascular: Negative.    Gastrointestinal: Negative.    Genitourinary:  Positive for vaginal pain.        Vulvar lesion     Musculoskeletal: Negative.    Skin: Negative.    Psychiatric/Behavioral: Negative.            Objective   BP (!) 160/110 (BP Location: Left arm, Patient Position: Sitting, Cuff Size: Standard)      Physical Exam  Vitals and  nursing note reviewed.   Constitutional:       General: She is not in acute distress.     Appearance: Normal appearance. She is well-developed.   HENT:      Head: Normocephalic and atraumatic.   Eyes:      Conjunctiva/sclera: Conjunctivae normal.   Pulmonary:      Effort: Pulmonary effort is normal. No respiratory distress.   Abdominal:      General: Abdomen is flat. There is no distension.   Genitourinary:     Vagina: No vaginal discharge, erythema or bleeding.      Cervix: No cervical motion tenderness, discharge, lesion or cervical bleeding.      Uterus: Not enlarged and not tender.       Adnexa:         Right: No mass, tenderness or fullness.          Left: No mass, tenderness or fullness.            Comments: Inguinal flat erythematous rash c/w yeast   Small slightly raised right vulvar cyst vs ingrown hair  Musculoskeletal:      Cervical back: Neck supple.      Right lower leg: No edema.      Left lower leg: No edema.   Skin:     General: Skin is warm and dry.   Neurological:      General: No focal deficit present.      Mental Status: She is alert. Mental status is at baseline.   Psychiatric:         Mood and Affect: Mood normal.         Behavior: Behavior normal.         Thought Content: Thought content normal.         Judgment: Judgment normal.

## 2025-08-13 ENCOUNTER — ANNUAL EXAM (OUTPATIENT)
Dept: OBGYN CLINIC | Facility: CLINIC | Age: 35
End: 2025-08-13
Payer: COMMERCIAL

## 2025-08-13 PROCEDURE — 87480 CANDIDA DNA DIR PROBE: CPT

## 2025-08-13 PROCEDURE — G0145 SCR C/V CYTO,THINLAYER,RESCR: HCPCS

## 2025-08-13 PROCEDURE — 87660 TRICHOMONAS VAGIN DIR PROBE: CPT

## 2025-08-13 PROCEDURE — 87510 GARDNER VAG DNA DIR PROBE: CPT
